# Patient Record
Sex: MALE | Race: WHITE | Employment: OTHER | URBAN - METROPOLITAN AREA
[De-identification: names, ages, dates, MRNs, and addresses within clinical notes are randomized per-mention and may not be internally consistent; named-entity substitution may affect disease eponyms.]

---

## 2016-12-15 LAB — TSH SERPL-ACNC: 0.61 ULU/ML (ref 0.35–4.94)

## 2018-10-05 LAB
ALT SERPL-CCNC: 23 IU/L (ref 8–45)
CHOLEST SERPL-MCNC: 169 MG/DL (ref 100–199)
HDLC SERPL-MCNC: 63 MG/DL
LDLC SERPL CALC-MCNC: 88 MG/DL
NONHDLC SERPL-MCNC: 106 MG/DL
TRIGL SERPL-MCNC: 91 MG/DL

## 2020-03-03 LAB — INR PPP: 1

## 2020-07-09 ENCOUNTER — TRANSFERRED RECORDS (OUTPATIENT)
Dept: HEALTH INFORMATION MANAGEMENT | Facility: CLINIC | Age: 60
End: 2020-07-09

## 2020-07-30 ENCOUNTER — TRANSFERRED RECORDS (OUTPATIENT)
Dept: HEALTH INFORMATION MANAGEMENT | Facility: CLINIC | Age: 60
End: 2020-07-30

## 2020-10-21 ENCOUNTER — TRANSFERRED RECORDS (OUTPATIENT)
Dept: HEALTH INFORMATION MANAGEMENT | Facility: CLINIC | Age: 60
End: 2020-10-21

## 2020-10-27 ENCOUNTER — TRANSFERRED RECORDS (OUTPATIENT)
Dept: HEALTH INFORMATION MANAGEMENT | Facility: CLINIC | Age: 60
End: 2020-10-27

## 2020-11-16 ENCOUNTER — TRANSFERRED RECORDS (OUTPATIENT)
Dept: HEALTH INFORMATION MANAGEMENT | Facility: CLINIC | Age: 60
End: 2020-11-16

## 2020-11-16 LAB
CREAT SERPL-MCNC: 0.96 MG/DL (ref 0.72–1.25)
GFR SERPL CREATININE-BSD FRML MDRD: >60 ML/MIN/1.73M2
GLUCOSE SERPL-MCNC: 112 MG/DL (ref 65–100)
POTASSIUM SERPL-SCNC: 4.5 MMOL/L (ref 3.5–5)

## 2020-11-24 ENCOUNTER — TRANSFERRED RECORDS (OUTPATIENT)
Dept: HEALTH INFORMATION MANAGEMENT | Facility: CLINIC | Age: 60
End: 2020-11-24

## 2020-12-02 ENCOUNTER — TRANSFERRED RECORDS (OUTPATIENT)
Dept: HEALTH INFORMATION MANAGEMENT | Facility: CLINIC | Age: 60
End: 2020-12-02

## 2020-12-03 ENCOUNTER — TRANSFERRED RECORDS (OUTPATIENT)
Dept: HEALTH INFORMATION MANAGEMENT | Facility: CLINIC | Age: 60
End: 2020-12-03

## 2020-12-07 ENCOUNTER — TRANSFERRED RECORDS (OUTPATIENT)
Dept: HEALTH INFORMATION MANAGEMENT | Facility: CLINIC | Age: 60
End: 2020-12-07

## 2020-12-07 LAB — EJECTION FRACTION: NORMAL %

## 2020-12-14 ENCOUNTER — TRANSFERRED RECORDS (OUTPATIENT)
Dept: HEALTH INFORMATION MANAGEMENT | Facility: CLINIC | Age: 60
End: 2020-12-14

## 2020-12-26 ENCOUNTER — TRANSFERRED RECORDS (OUTPATIENT)
Dept: HEALTH INFORMATION MANAGEMENT | Facility: CLINIC | Age: 60
End: 2020-12-26

## 2020-12-28 ENCOUNTER — TRANSFERRED RECORDS (OUTPATIENT)
Dept: HEALTH INFORMATION MANAGEMENT | Facility: CLINIC | Age: 60
End: 2020-12-28

## 2021-01-04 ENCOUNTER — TRANSFERRED RECORDS (OUTPATIENT)
Dept: HEALTH INFORMATION MANAGEMENT | Facility: CLINIC | Age: 61
End: 2021-01-04

## 2021-01-13 ENCOUNTER — TRANSFERRED RECORDS (OUTPATIENT)
Dept: HEALTH INFORMATION MANAGEMENT | Facility: CLINIC | Age: 61
End: 2021-01-13

## 2021-01-27 ENCOUNTER — TRANSFERRED RECORDS (OUTPATIENT)
Dept: HEALTH INFORMATION MANAGEMENT | Facility: CLINIC | Age: 61
End: 2021-01-27

## 2021-01-28 ENCOUNTER — TRANSFERRED RECORDS (OUTPATIENT)
Dept: HEALTH INFORMATION MANAGEMENT | Facility: CLINIC | Age: 61
End: 2021-01-28

## 2021-02-10 ENCOUNTER — TRANSFERRED RECORDS (OUTPATIENT)
Dept: HEALTH INFORMATION MANAGEMENT | Facility: CLINIC | Age: 61
End: 2021-02-10

## 2021-02-19 ENCOUNTER — TRANSFERRED RECORDS (OUTPATIENT)
Dept: HEALTH INFORMATION MANAGEMENT | Facility: CLINIC | Age: 61
End: 2021-02-19

## 2021-02-22 ENCOUNTER — MEDICAL CORRESPONDENCE (OUTPATIENT)
Dept: HEALTH INFORMATION MANAGEMENT | Facility: CLINIC | Age: 61
End: 2021-02-22

## 2021-02-22 ENCOUNTER — TELEPHONE (OUTPATIENT)
Dept: ORTHOPEDICS | Facility: CLINIC | Age: 61
End: 2021-02-22

## 2021-02-22 NOTE — TELEPHONE ENCOUNTER
See phone message.  I called pt back. He wants appt new for Cervical Spine & Lumbar Spine so I told him  does not treat cervical spine & advised  he see either  or  & he agreed.  appt made.  Monica Mckeon RN.  .

## 2021-02-23 ENCOUNTER — TELEPHONE (OUTPATIENT)
Dept: ORTHOPEDICS | Facility: CLINIC | Age: 61
End: 2021-02-23

## 2021-02-23 NOTE — TELEPHONE ENCOUNTER
RECORDS RECEIVED FROM: Neck pain & headaches & Low back pain-pt will get New Lumbar MRI done at [facility] before appt.  need imaging and records including  cervical MRIs from [facility] done in 2021 & 2020, [facility], [facility] imaging & [facility] including Spinal Cord Stim. placed 12-3-21 at [facility] and records from [facility] including Lumbar Ablation Per Monica GRANDE, has not had surgery on spine / [facility]   DATE RECEIVED: Apr 1, 2021     NOTES STATUS DETAILS   OFFICE NOTE from referring provider Care Everywhere    OFFICE NOTE from other specialist In process    DISCHARGE SUMMARY from hospital N/A    DISCHARGE REPORT from the ER N/A    OPERATIVE REPORT Care Everywhere 13 from 2017 to now   MEDICATION LIST Internal    IMPLANT RECORD/STICKER Care Everywhere    LABS     CBC/DIFF N/A    CULTURES N/A    INJECTIONS DONE IN RADIOLOGY N/A    MRI In process    CT SCAN In process    XRAYS (IMAGES & REPORTS) In process    TUMOR     PATHOLOGY  Slides & report N/A    03/25/21   1:50 PM   [facility] IMAGES RESOLVED IN PACS  CALLED [facility] AGAIN AND WAS SENT TO   Vangie Livingston CMA      03/23/21   2:52 PM   CALLED [facility] FOR IMAGES, THEY SAID ALL IMAGES ARE AT [facility] AND [facility].  CALLED [facility] AND THEY WILL PUSH WHAT THEY HAVE  CALLED [facility], WAS DIRECTED TO . [facility] FOR IMAGES.  Vangie Livingston CMA    03/19/21   1:31 PM   [facility] IMAGES RESOLVED IN PACS  Vangie Livingston CMA    03/16/21   11:47 AM   [facility] RECORDS SENT TO [facility]  Vangie Livingston CMA    02/23/21   2:54 PM   Called [facility] for images  Request sent to [facility]  All stimulator and branch block notes in care everywhere  Vangie Livingston CMA    Action 3/17/2021 2:40PM    Action Taken I called [facility] to have IMG pushed to PACS. [phone] - phone line was busy- I was on hold for 5mins.     I called [facility] Ph: [phone] #3- their phone went to

## 2021-02-23 NOTE — TELEPHONE ENCOUNTER
Health Call Center    Phone Message    May a detailed message be left on voicemail: yes     Reason for Call: Other: This pt of Dr. Vega's called to speak with someone on Dr. Veag's care team. This pt is scheduled for an appt with Dr. Vega on 4/1. This pt was told the clinic would be sending him release of information documents that he would foward to various locations. The pt is requesting that someone on Dr. Vega's care team have those THANG forms sent to him at BOTH email addresses on file. Please reach out to the pt for any additional information.     Action Taken: Message routed to:  [facility]o    Travel Screening: Not Applicable

## 2021-03-01 NOTE — TELEPHONE ENCOUNTER
[facility] Call [facility]    Phone Message    May a detailed message be left on voicemail: yes     Reason for Call: Other: Patient has not seen any of the THANG forms in his email. He would like it also sent to his wifes email at [email or web address]     Action Taken: Other: ORTHO    Travel Screening: Not Applicable

## 2021-03-01 NOTE — TELEPHONE ENCOUNTER
Emailed THANG form to email that patient provided. Called patient to confirm - his wife did receive the email. He was telling me more info about his appointment and our call disconnected, and I was unable to reach again him after multiple times of trying to call him back (call went straight to voicemail every time).

## 2021-03-04 ENCOUNTER — TELEPHONE (OUTPATIENT)
Dept: ORTHOPEDICS | Facility: CLINIC | Age: 61
End: 2021-03-04

## 2021-03-04 NOTE — TELEPHONE ENCOUNTER
[facility] Call Center    Phone Message    May a detailed message be left on voicemail: yes     Reason for Call: Other: Patient wondering if the forms he recently faxed were received. Please call paitent back to verify     Action Taken: Message routed to:  [facility] ortho    Travel Screening: Not Applicable

## 2021-03-15 NOTE — TELEPHONE ENCOUNTER
[facility] Call Center    Phone Message    May a detailed message be left on voicemail: yes     Reason for Call: Other: Patient's spouse is checking on the status of information being faxed over to Dr. Vega's office from their doctor's office.     Action Taken: Message routed to:  [facility] Orthopedics    Travel Screening: Not Applicable                                                                     3

## 2021-03-15 NOTE — TELEPHONE ENCOUNTER
Called patient's wife back and let her know that we still have not received anything. They will call the [facility] clinic as well as [facility] to let them know that the records need to be sent to us.

## 2021-03-16 DIAGNOSIS — M54.9 BACK PAIN: Primary | ICD-10-CM

## 2021-03-16 DIAGNOSIS — M54.2 NECK PAIN: ICD-10-CM

## 2021-03-19 ENCOUNTER — TELEPHONE (OUTPATIENT)
Dept: ORTHOPEDICS | Facility: CLINIC | Age: 61
End: 2021-03-19

## 2021-03-19 NOTE — TELEPHONE ENCOUNTER
[facility] Call Center    Phone Message    May a detailed message be left on voicemail: yes     Reason for Call: Other: Jasmine from [facility] is requesting a call back. She wants to verify if we have received the patients records, as well as what kind of specific records would we need from them for the patient. If you could give her a call back as soon as you can.      Action Taken: Other: ORTHO    Travel Screening: Not Applicable

## 2021-03-31 NOTE — PROGRESS NOTES
In-Person Visit    REASON FOR CONSULTATION: Consult (Entire spine pain. Having headaches and light headedness)     REFERRING PHYSICIAN: patient states he was referred from the [facility] in [place] Tania Friday  PCP: Christo Duarte    History of Present Illness:  60/m, RHD, with a complex PMH including post concussion syndrome, cervicogenic headaches, lumbosacral facet arthropathy, chronic pain, depression, anxiety, CVA and TIA seen for headaches and cervical, thoracic and lumbar spine pain.     Regarding his headaches and neck pain, the patient states he has been experiencing daily neck pain and headaches since 1995 when a TV landed on top of his head. Patient reports his neck pain and headaches are his most bothersome complaint currently, as they interfere with his cognitive function and ability to work. Patient endorses pain radiating to his left shoulder and arm to the elbow. He also endorses bilateral numbness and paresthesias in his right small, ring and middle fingers.  Patient states he has never had surgery on his cervical spine. He reports trouble with dexterity and fine motor movements in his hands, which has been present for the last 6 months.     Neck and headaches 80%, Arm 20%,  Left > Right  Worse:  Increased activity and head movements.   Better:  Ice and heat     Regarding his back pain, the patient reports approximately 20 plus years of lower back pain. He endorses one year of associated pain radiating into the right buttock and right posterior thigh to the knee, as well as, numbness to plantar aspect of the bilateral feet, R > L.  He endorses difficulty with his balance.     Back 80%, leg 20%, Right > Left   Worse: increased activity, movement, lifting at work.   Better: ice and heat     Previous treament:   Oral antiinflammatories - no significant relief.   Has taken up to 180mg of Oxycodone per day in the past for chronic pain.   Currently taking 30 mg of Hydrocodone per day for pain  regimen.  Physical therapy including aqua therapy, which he notes provided moderate relief. Patient states it has been years since he has done any formal therapy.   Acupuncture, massage, chiropractic with mild relief.     Multiple Cervical and lumbar RFA procedures ([facility] and other pain clinic) - modest relief.  Spinal stimulator in December of 2020 (Dr. José López, [facility]) - moderate relief.     Oswestry (JESSICA) Questionnaire    OSWESTRY DISABILITY INDEX 4/1/2021   Count 9   Sum 27   Oswestry Score (%) 60   Some recent data might be hidden      Neck Disability Index (NDI) Questionnaire    Neck Disability Index (NDI) 4/1/2021   Neck Disability Index: Count 10   NDI: Total Score = SUM (points for all 10 findings) 33   Neck Disability in Percent = (Total Score) / 50 * 100 66 (%)   Some recent data might be hidden      Visual Analog Pain Scale  Back Pain Scale 0-10: 7  Right leg pain: 0  Left leg pain: 0  Neck Pain Scale 0-10: 7  Right arm pain: 0  Left arm pain: 0    PROMIS-10 Scores  Global Mental Health Score: (P) 9  Global Physical Health Score: (P) 9  PROMIS TOTAL - SUBSCORES: (P) 18    ROS: A 12-point review of systems was completed and is negative except for otherwise noted above in the history of present illness.    Med Hx:  No past medical history on file.   Depression   Other acquired torsion dystonia   Neck pain   Chest wall pain   Migraine without aura, intractable   Cerebral infarction   Bicuspid aortic valve.   Facet arthropathy, lumbosacral   Spondyloarthritis   Chronic pain associated with significant psychosocial dysfunction   Arthropathy of cervical facet joint  AC joint arthritis   TIA   Malignant neoplasm of left breast   Uncomplicated opioid dependence   Concussion without LOC   Post concussion syndrome   Vertebral artery dissection   Cervical dystonia   Cervicalgia   Chronic pain syndrome   PHILLIP     Surg Hx:  No past surgical history on file.    Allergies:  No Known  "Allergies    Meds:  Current Outpatient Medications   Medication     atorvastatin (LIPITOR) 40 MG tablet     cyclobenzaprine (FLEXERIL) 10 MG tablet     diazepam (VALIUM) 5 MG tablet     ferrous sulfate (FEROSUL) 325 (65 Fe) MG tablet     HYDROcodone-acetaminophen (NORCO)  MG per tablet     magnesium sulfate 40 GM/1000ML SOLN injection     pantoprazole (PROTONIX) 40 MG EC tablet     prazosin (MINIPRESS) 2 MG capsule     senna-docusate (SENOKOT-S/PERICOLACE) 8.6-50 MG tablet     topiramate (TOPAMAX) 25 MG tablet     traZODone (DESYREL) 100 MG tablet     No current facility-administered medications for this visit.      Fam Hx:  No family history on file.    P/S Hx:  former smoker, 3 pack years   Rare alcohol use.   Lives in mobile home with wife.   Works as  at [employer].     Physical Exam:  Very pleasant, alert, oriented x 3, cooperative.  Not in cardiorespiratory distress.  Ht 1.676 m (5' 6\")   Wt 74.8 kg (165 lb)   BMI 26.63 kg/m    Normal upright posture.    Normal gait without assistive device.  No antalgia / imbalance.  Positive Romberg's.    Neck: normal neck posture  No deformity, no skin lesions or surgical scars.  Localizes pain throughout posterior midline and paraspinal cervical spine into the occipital region.   Diffuse paraspinal tenderness. No midline tenderness.   ROM:reduced flexion, extension, R and L rotation secondary to pain and stiffness   (+) Spurling's bilat.  (+) Lhermitte's.     Neuro Exam:  Motor: 3/5 strength for all muscle groups in both UE's.  Sensory:  Intact to light touch in both UE's.   Reflexes:      Biceps 2+ bilat.      Brachioradialis 2+ bilat.      Triceps 1+ bilat.    (-) Travis bilat.    Upper extremity:  Full pulses, pink nailbeds, good capillary / refill.  (-) atrophy / asymmetry.    Back: no deformity, well healed small lumbar midline surgical scar and well healed surgical scar to right lower back. No skin lesions.   Tenderness: (-) midline. + paraspinal " tenderness bilaterally, (-) R and L PSIS.  ROM:   Reduced flexion and extension limited by pain and stiffness.     Neuro Exam:  Motor: 3/5 strength for all muscle groups in both LE's, left ankle plantar flexion mildly weaker than right.   Sensory:  Intact to light touch in both LE's.   Reflexes:  Knee 2+ bilat.  Ankle 2+ bilaterally.  (-) Babinski, (-) clonus.    Lower Extremity:  Equal leg lengths, full pulses, (-) atrophy / asymmetry.  Full painless passive knee and ankle motion.  Straight leg raise: (-) right, (-) left.  Hip impingement: (-) right, (-) left.  MARIELLE/Duy's: (-) right, (-) left.        Imaging:   Cervical MRI 1/13/2021 shows multilevel cervical degenerative changes or spondylosis, but without clear focal stenosis.  No spinal cord deformation; no cord signal change or myelomalacia.  Radiologist report:  IMPRESSION:  1.  Multilevel degenerative disc disease.  2.  No central spinal stenosis.  3.  Disc/osteophyte complexes cause foraminal stenosis which at C3-C4 is moderate bilaterally, at C4-C5 and C5-C6 is severe bilaterally, at C6-C7 is moderate on the left and mild on the right.  4.  2 cm thyroid nodule in the right side. Thyroid ultrasound is recommended.    Impression:   60/m, RHD, with:  1.  Multilevel spondylosis C3-C7.  2.  Severe foraminal stenosis bilateral C4-5 and C5-6; milder at bilateral C3-4 and left C6-7.  3.  Chronic axial neck pain and cervicogenic headaches.  4.  Multiple medical co-morbidities including post concussion syndrome, chronic pain, depression, anxiety, CVA and TIA.    Plan:   Imaging was reviewed with the patient. Conservative and surgical options were discussed. The patient's cervical symptoms are likely related to his cervicogenic headaches, however, he does have findings of foraminal stenosis at multiple levels including C4-5 and C5-6, which could be contributing to his radicular symptoms in his left shoulder and arm. We ordered a left C5 nerve root injection with  steroid today to determine if this offers any relief. If the patient does not have any relief, we will then order a left C6 nerve root injection with steroid. If the patient has no relief with either of these injections, then it is unlikely that surgery will provide any benefit. We would then refer the patient to Dr. Giordano with PM&R or the pain clinic. We also ordered updated cervical flexion and extension x-rays today in clinic and an updated cervical CT.     - Left C5 SNRB with steroid (dxtic and txtic).  Advised to call 1-2 days after to let us know of injection response.  Virtual RTC after to discuss injection response.  If (-), may consider left C6 SNRB with steoid.  If both injections (-), will consider referral to either [facility] (Dr. Giordano) or [facility] - for further eval and mgmt.    All questions and concerns were answered to the patient's apparent satisfaction before leaving the clinic.     Maryjane Randall MD   Orthopedic Surgery, PGY-1    Attestation:  I (Dr. Buzz Vega - Spine Surgeon) have personally evaluated patient with PGY-1 Tonia, and agree with findings and plan outlined in the note, which I also edited.  I discussed at length with the patient/family, explained the nature of spinal condition, and formulated workup and/or treatment plan together.  All questions were answered to the best of my ability and to patient's apparent satisfaction.    TT 45 mins.    Buzz Vega MD    Orthopaedic Spine Surgery  Dept Orthopaedic Surgery, [facility]  [phone] office, [phone] pager  [email or web address]

## 2021-04-01 ENCOUNTER — OFFICE VISIT (OUTPATIENT)
Dept: ORTHOPEDICS | Facility: CLINIC | Age: 61
End: 2021-04-01
Payer: COMMERCIAL

## 2021-04-01 ENCOUNTER — ANCILLARY PROCEDURE (OUTPATIENT)
Dept: GENERAL RADIOLOGY | Facility: CLINIC | Age: 61
End: 2021-04-01
Attending: ORTHOPAEDIC SURGERY
Payer: COMMERCIAL

## 2021-04-01 ENCOUNTER — PRE VISIT (OUTPATIENT)
Dept: ORTHOPEDICS | Facility: CLINIC | Age: 61
End: 2021-04-01

## 2021-04-01 VITALS — HEIGHT: 66 IN | WEIGHT: 165 LBS | BODY MASS INDEX: 26.52 KG/M2

## 2021-04-01 DIAGNOSIS — M54.12 CERVICAL RADICULOPATHY: ICD-10-CM

## 2021-04-01 DIAGNOSIS — M54.2 NECK PAIN: Primary | ICD-10-CM

## 2021-04-01 DIAGNOSIS — M54.9 BACK PAIN: ICD-10-CM

## 2021-04-01 DIAGNOSIS — M54.2 NECK PAIN: ICD-10-CM

## 2021-04-01 PROCEDURE — 99204 OFFICE O/P NEW MOD 45 MIN: CPT | Mod: GC | Performed by: ORTHOPAEDIC SURGERY

## 2021-04-01 PROCEDURE — 72082 X-RAY EXAM ENTIRE SPI 2/3 VW: CPT | Performed by: STUDENT IN AN ORGANIZED HEALTH CARE EDUCATION/TRAINING PROGRAM

## 2021-04-01 PROCEDURE — 72040 X-RAY EXAM NECK SPINE 2-3 VW: CPT | Performed by: RADIOLOGY

## 2021-04-01 RX ORDER — PANTOPRAZOLE SODIUM 40 MG/1
40 TABLET, DELAYED RELEASE ORAL 2 TIMES DAILY
COMMUNITY
Start: 2021-01-20

## 2021-04-01 RX ORDER — HYDROCODONE BITARTRATE AND ACETAMINOPHEN 10; 325 MG/1; MG/1
1 TABLET ORAL EVERY 4 HOURS PRN
COMMUNITY
Start: 2021-03-02

## 2021-04-01 RX ORDER — CYCLOBENZAPRINE HCL 10 MG
10 TABLET ORAL 2 TIMES DAILY PRN
COMMUNITY
Start: 2020-09-29

## 2021-04-01 RX ORDER — MAGNESIUM SULFATE HEPTAHYDRATE 40 MG/ML
INJECTION, SOLUTION INTRAVENOUS 3 TIMES DAILY
COMMUNITY
Start: 2020-08-01

## 2021-04-01 RX ORDER — FERROUS SULFATE 325(65) MG
325 TABLET ORAL
COMMUNITY

## 2021-04-01 RX ORDER — TRAZODONE HYDROCHLORIDE 100 MG/1
100-200 TABLET ORAL AT BEDTIME
COMMUNITY
Start: 2021-03-05

## 2021-04-01 RX ORDER — DIAZEPAM 5 MG
TABLET ORAL 3 TIMES DAILY
COMMUNITY
Start: 2021-02-04

## 2021-04-01 RX ORDER — TOPIRAMATE 25 MG/1
50 TABLET, FILM COATED ORAL AT BEDTIME
COMMUNITY
Start: 2021-02-01

## 2021-04-01 RX ORDER — AMOXICILLIN 250 MG
1 CAPSULE ORAL EVERY EVENING
COMMUNITY
Start: 2020-07-28

## 2021-04-01 RX ORDER — ATORVASTATIN CALCIUM 40 MG/1
40 TABLET, FILM COATED ORAL EVERY EVENING
COMMUNITY
Start: 2021-01-20

## 2021-04-01 RX ORDER — PRAZOSIN HYDROCHLORIDE 2 MG/1
2 CAPSULE ORAL AT BEDTIME
COMMUNITY
Start: 2020-11-11

## 2021-04-01 ASSESSMENT — MIFFLIN-ST. JEOR: SCORE: 1496.19

## 2021-04-01 NOTE — LETTER
Date:April 10, 2021      Patient was self referred, no letter generated. Do not send.        [facility] Health Information

## 2021-04-01 NOTE — LETTER
4/1/2021         RE: Duy Resendiz  [address] [facility] [id number]        Dear Colleague,    Thank you for referring your patient, Duy Resendiz, to the [facility] [place]. Please see a copy of my visit note below.    In-Person Visit    REASON FOR CONSULTATION: Consult (Entire spine pain. Having headaches and light headedness)     REFERRING PHYSICIAN: patient states he was referred from the [facility] [place]  PCP: Christo Duarte    History of Present Illness:  60/m, RHD, with a complex PMH including post concussion syndrome, cervicogenic headaches, lumbosacral facet arthropathy, chronic pain, depression, anxiety, CVA and TIA seen for headaches and cervical, thoracic and lumbar spine pain.     Regarding his headaches and neck pain, the patient states he has been experiencing daily neck pain and headaches since 1995 when a TV landed on top of his head. Patient reports his neck pain and headaches are his most bothersome complaint currently, as they interfere with his cognitive function and ability to work. Patient endorses pain radiating to his left shoulder and arm to the elbow. He also endorses bilateral numbness and paresthesias in his right small, ring and middle fingers.  Patient states he has never had surgery on his cervical spine. He reports trouble with dexterity and fine motor movements in his hands, which has been present for the last 6 months.     Neck and headaches 80%, Arm 20%,  Left > Right  Worse:  Increased activity and head movements.   Better:  Ice and heat     Regarding his back pain, the patient reports approximately 20 plus years of lower back pain. He endorses one year of associated pain radiating into the right buttock and right posterior thigh to the knee, as well as, numbness to plantar aspect of the bilateral feet, R > L.  He endorses difficulty with his balance.     Back 80%, leg 20%, Right > Left   Worse: increased  activity, movement, lifting at work.   Better: ice and heat     Previous treament:   Oral antiinflammatories - no significant relief.   Has taken up to 180mg of Oxycodone per day in the past for chronic pain.   Currently taking 30 mg of Hydrocodone per day for pain regimen.  Physical therapy including aqua therapy, which he notes provided moderate relief. Patient states it has been years since he has done any formal therapy.   Acupuncture, massage, chiropractic with mild relief.     Multiple Cervical and lumbar RFA procedures ([facility] and other pain clinic) - modest relief.  Spinal stimulator in December of 2020 (Dr. José López, [facility]) - moderate relief.     Oswestry (JESSICA) Questionnaire    OSWESTRY DISABILITY INDEX 4/1/2021   Count 9   Sum 27   Oswestry Score (%) 60   Some recent data might be hidden      Neck Disability Index (NDI) Questionnaire    Neck Disability Index (NDI) 4/1/2021   Neck Disability Index: Count 10   NDI: Total Score = SUM (points for all 10 findings) 33   Neck Disability in Percent = (Total Score) / 50 * 100 66 (%)   Some recent data might be hidden      Visual Analog Pain Scale  Back Pain Scale 0-10: 7  Right leg pain: 0  Left leg pain: 0  Neck Pain Scale 0-10: 7  Right arm pain: 0  Left arm pain: 0    PROMIS-10 Scores  Global Mental Health Score: (P) 9  Global Physical Health Score: (P) 9  PROMIS TOTAL - SUBSCORES: (P) 18    ROS: A 12-point review of systems was completed and is negative except for otherwise noted above in the history of present illness.    Med Hx:  No past medical history on file.   Depression   Other acquired torsion dystonia   Neck pain   Chest wall pain   Migraine without aura, intractable   Cerebral infarction   Bicuspid aortic valve.   Facet arthropathy, lumbosacral   Spondyloarthritis   Chronic pain associated with significant psychosocial dysfunction   Arthropathy of cervical facet joint  AC joint arthritis   TIA   Malignant neoplasm of left  "breast   Uncomplicated opioid dependence   Concussion without LOC   Post concussion syndrome   Vertebral artery dissection   Cervical dystonia   Cervicalgia   Chronic pain syndrome   PHILLIP     Surg Hx:  No past surgical history on file.    Allergies:  No Known Allergies    Meds:  Current Outpatient Medications   Medication     atorvastatin (LIPITOR) 40 MG tablet     cyclobenzaprine (FLEXERIL) 10 MG tablet     diazepam (VALIUM) 5 MG tablet     ferrous sulfate (FEROSUL) 325 (65 Fe) MG tablet     HYDROcodone-acetaminophen (NORCO)  MG per tablet     magnesium sulfate 40 GM/1000ML SOLN injection     pantoprazole (PROTONIX) 40 MG EC tablet     prazosin (MINIPRESS) 2 MG capsule     senna-docusate (SENOKOT-S/PERICOLACE) 8.6-50 MG tablet     topiramate (TOPAMAX) 25 MG tablet     traZODone (DESYREL) 100 MG tablet     No current facility-administered medications for this visit.      Fam Hx:  No family history on file.    P/S Hx:  former smoker, 3 pack years   Rare alcohol use.   Lives in mobile home with wife.   Works as  at [employer].     Physical Exam:  Very pleasant, alert, oriented x 3, cooperative.  Not in cardiorespiratory distress.  Ht 1.676 m (5' 6\")   Wt 74.8 kg (165 lb)   BMI 26.63 kg/m    Normal upright posture.    Normal gait without assistive device.  No antalgia / imbalance.  Positive Romberg's.    Neck: normal neck posture  No deformity, no skin lesions or surgical scars.  Localizes pain throughout posterior midline and paraspinal cervical spine into the occipital region.   Diffuse paraspinal tenderness. No midline tenderness.   ROM:reduced flexion, extension, R and L rotation secondary to pain and stiffness   (+) Spurling's bilat.  (+) Lhermitte's.     Neuro Exam:  Motor: 3/5 strength for all muscle groups in both UE's.  Sensory:  Intact to light touch in both UE's.   Reflexes:      Biceps 2+ bilat.      Brachioradialis 2+ bilat.      Triceps 1+ bilat.    (-) Travis bilat.    Upper " extremity:  Full pulses, pink nailbeds, good capillary / refill.  (-) atrophy / asymmetry.    Back: no deformity, well healed small lumbar midline surgical scar and well healed surgical scar to right lower back. No skin lesions.   Tenderness: (-) midline. + paraspinal tenderness bilaterally, (-) R and L PSIS.  ROM:   Reduced flexion and extension limited by pain and stiffness.     Neuro Exam:  Motor: 3/5 strength for all muscle groups in both LE's, left ankle plantar flexion mildly weaker than right.   Sensory:  Intact to light touch in both LE's.   Reflexes:  Knee 2+ bilat.  Ankle 2+ bilaterally.  (-) Babinski, (-) clonus.    Lower Extremity:  Equal leg lengths, full pulses, (-) atrophy / asymmetry.  Full painless passive knee and ankle motion.  Straight leg raise: (-) right, (-) left.  Hip impingement: (-) right, (-) left.  MARIELLE/Duy's: (-) right, (-) left.        Imaging:   Cervical MRI 1/13/2021 shows multilevel cervical degenerative changes or spondylosis, but without clear focal stenosis.  No spinal cord deformation; no cord signal change or myelomalacia.  Radiologist report:  IMPRESSION:  1.  Multilevel degenerative disc disease.  2.  No central spinal stenosis.  3.  Disc/osteophyte complexes cause foraminal stenosis which at C3-C4 is moderate bilaterally, at C4-C5 and C5-C6 is severe bilaterally, at C6-C7 is moderate on the left and mild on the right.  4.  2 cm thyroid nodule in the right side. Thyroid ultrasound is recommended.    Impression:   60/m, RHD, with:  1.  Multilevel spondylosis C3-C7.  2.  Severe foraminal stenosis bilateral C4-5 and C5-6; milder at bilateral C3-4 and left C6-7.  3.  Chronic axial neck pain and cervicogenic headaches.  4.  Multiple medical co-morbidities including post concussion syndrome, chronic pain, depression, anxiety, CVA and TIA.    Plan:   Imaging was reviewed with the patient. Conservative and surgical options were discussed. The patient's cervical symptoms are  likely related to his cervicogenic headaches, however, he does have findings of foraminal stenosis at multiple levels including C4-5 and C5-6, which could be contributing to his radicular symptoms in his left shoulder and arm. We ordered a left C5 nerve root injection with steroid today to determine if this offers any relief. If the patient does not have any relief, we will then order a left C6 nerve root injection with steroid. If the patient has no relief with either of these injections, then it is unlikely that surgery will provide any benefit. We would then refer the patient to Dr. Giordano with PM&R or the pain clinic. We also ordered updated cervical flexion and extension x-rays today in clinic and an updated cervical CT.     - Left C5 SNRB with steroid (dxtic and txtic).  Advised to call 1-2 days after to let us know of injection response.  Virtual RTC after to discuss injection response.  If (-), may consider left C6 SNRB with steoid.  If both injections (-), will consider referral to either [facility] (Dr. Giordano) or [facility] - for further eval and mgmt.    All questions and concerns were answered to the patient's apparent satisfaction before leaving the clinic.     Maryjane Randall MD   Orthopedic Surgery, PGY-1    Attestation:  I (Dr. Buzz Vega - Spine Surgeon) have personally evaluated patient with PGY-1 Freet[place], and agree with findings and plan outlined in the note, which I also edited.  I discussed at length with the patient/family, explained the nature of spinal condition, and formulated workup and/or treatment plan together.  All questions were answered to the best of my ability and to patient's apparent satisfaction.    TT 45 mins.    Buzz Vega MD    Orthopaedic Spine Surgery  Dept Orthopaedic Surgery, [facility]  [phone] office, [phone] pager  [email or web address]        Again, thank you for allowing me to participate in the care  of your patient.        Sincerely,        Buzz Vega MD

## 2021-04-08 ENCOUNTER — TELEPHONE (OUTPATIENT)
Dept: ORTHOPEDICS | Facility: CLINIC | Age: 61
End: 2021-04-08

## 2021-04-08 NOTE — TELEPHONE ENCOUNTER
[facility] Call Center    Phone Message:  Pt has questions regarding the nerve block that he is getting on 4/30/21.  Pt is wondering how long this nerve block is supposed to be effective and also how this nerve block compares to other nerve blocks.  Pt would like a call back to go through his questions.    May a detailed message be left on voicemail: Yes     Reason for Call: Other: Nerve Block Questions, Call Back     Action Taken: Message routed to:  [facility] Team    Travel Screening: Not Applicable

## 2021-04-09 NOTE — TELEPHONE ENCOUNTER
Problem: Patient Care Overview  Goal: Plan of Care Review  Outcome: Ongoing (interventions implemented as appropriate)  Pt. On room air in no apparent distress. Will cont. To monitor.         See phone message from pt.  See dictation of 4-1-21 for plan.  I called pt back & told him there is no way to know if injection will help pain or how long that might last & he understood. He wanted to know if this is different than the previous RFAs he has had done & I told him yes this is not an Ablation.  Reviewed plan in dictation so pt knows to call us back 1 week after injection if NO relief so we can order the other injection to be done before RTC appt 5-18-21.  Pt agreed.    Call back prn. Pt agreed.  W.O./Monica Mkceon RN.

## 2021-04-25 ENCOUNTER — DOCUMENTATION ONLY (OUTPATIENT)
Dept: LAB | Facility: CLINIC | Age: 61
End: 2021-04-25

## 2021-04-26 ENCOUNTER — TELEPHONE (OUTPATIENT)
Dept: ORTHOPEDICS | Facility: CLINIC | Age: 61
End: 2021-04-26

## 2021-04-26 DIAGNOSIS — M54.12 CERVICAL RADICULOPATHY: ICD-10-CM

## 2021-04-26 DIAGNOSIS — M54.2 NECK PAIN: Primary | ICD-10-CM

## 2021-04-26 NOTE — TELEPHONE ENCOUNTER
[facility] Call Center    Phone Message    May a detailed message be left on voicemail: yes     Reason for Call: Other: Pt would like to start PT, dry-needing, pool therapy before starting with the other plan. Would like a call back to discuss with Monica.     Action Taken: Message routed to:  [facility] ortho    Travel Screening: Not Applicable

## 2021-04-26 NOTE — PROGRESS NOTES
See in basket message from Lab needing preprocedure COVID test order for Lab appt scheduled for tomorrow 4-27-21.  Those orders are placed by Radiology COVID Team but pt canceled his injection that had been scheduled for 4-30-21 so he does not need that Lab appt.  I canceled Lab appt.  See previous Triage note.    See 4-1-21 dictation for plan.  Pt also canceled his RTN appt 5-18-21.    I left pt message to call us back with his plan & we can certainly see him for another appt if he has questions about plan or wants to rediscuss options with .  Monica Mckeon RN.

## 2021-04-27 NOTE — TELEPHONE ENCOUNTER
See phone message.  See previous Triage message when pt canceled injection & RTN appt.    Pt called back & wants to try PT first including Dry needling & Pool therapy.  I left pt message that I will postal mail him 2 separate PT orders for land therapy including Dry needling & for Pool therapy & he should call Insurance to see where he can schedule.   I expressed best wishes that it will help his symptoms & to call us back prn to [facility] injection & RTN  appt prn.    S.o./Monica Mckeon RN.

## 2021-05-29 ENCOUNTER — RECORDS - HEALTHEAST (OUTPATIENT)
Dept: ADMINISTRATIVE | Facility: CLINIC | Age: 61
End: 2021-05-29

## 2021-05-30 ENCOUNTER — RECORDS - HEALTHEAST (OUTPATIENT)
Dept: ADMINISTRATIVE | Facility: CLINIC | Age: 61
End: 2021-05-30

## 2021-06-07 ENCOUNTER — TELEPHONE (OUTPATIENT)
Dept: ORTHOPEDICS | Facility: CLINIC | Age: 61
End: 2021-06-07

## 2021-06-07 NOTE — TELEPHONE ENCOUNTER
See phone message & see dictation of 4-1-21 for plan.    I called pt back who stated PT helped muscle pain & aches but still has Bilat. Hand numbness.    I reviewed plan in dictation & advised he RSC CT Cervical & XR Flex Ext Cervical & Left C5 NRB steroid  injection [phone] & he agreed.    He understands if No relief from injection then call us back to order different injection Left C6 SNRB with steroid & then make RTN appt [phone].  If gets relief then make RTN appt. Pt agreed.  W.O./Monica Mckeon RN.

## 2021-06-07 NOTE — TELEPHONE ENCOUNTER
[facility] Call Center    Phone Message    May a detailed message be left on voicemail: yes     Reason for Call: Order(s): Other:   Reason for requested: NEW order for cervical x-ray  Date needed: within 1-2 days  Provider name: Dr. Vega    Patient already has CT scan & injection scheduled for 7/21, so was hoping for an x-ray order early enough to schedule for the same day.    Action Taken: Message routed to:  [facility] ortho    Travel Screening: Not Applicable

## 2021-06-07 NOTE — TELEPHONE ENCOUNTER
"[facility] Call Center    Phone Message    May a detailed message be left on voicemail: yes     Reason for Call: Other: Patient stated PT is helping with muscle pain/ aches, though the symptoms of \"fogginess\" and bilateral hand numbness has not been resolved. Would like to know what Dr Vega suggests for next steps. Please call patient back at [phone]. Ok to leave  .      Action Taken: Message routed to:  [facility] Ortho    Travel Screening: Not Applicable                                                                      "

## 2021-06-09 DIAGNOSIS — Z11.59 ENCOUNTER FOR SCREENING FOR OTHER VIRAL DISEASES: ICD-10-CM

## 2021-06-18 DIAGNOSIS — M54.2 NECK PAIN: Primary | ICD-10-CM

## 2021-06-30 ENCOUNTER — TELEPHONE (OUTPATIENT)
Dept: ORTHOPEDICS | Facility: CLINIC | Age: 61
End: 2021-06-30

## 2021-06-30 NOTE — TELEPHONE ENCOUNTER
[facility] Call Center    Phone Message    May a detailed message be left on voicemail: yes     Reason for Call: Other: Patient would like to know more about the injection that was discussed. He would like to take his Covid test closer to home. Please reach out to pt     Action Taken: Message routed to:  [facility] ortho    Travel Screening: Not Applicable

## 2021-07-01 NOTE — TELEPHONE ENCOUNTER
See phone message & dictation of 4-1-21 for plan.  Pt canceled & RSC Cervical injection.  I called pt back & answered all his questions & reviewed plan in dictation so pt knows to call us back after injection about response to determine next steps.    Pt will also call COVID team [phone] to schedule test.  Call back prn patient agreed.  W.EMILIANO./Monica Mckeon RN.

## 2021-07-06 ENCOUNTER — MEDICAL CORRESPONDENCE (OUTPATIENT)
Dept: HEALTH INFORMATION MANAGEMENT | Facility: CLINIC | Age: 61
End: 2021-07-06

## 2021-07-07 ENCOUNTER — TELEPHONE (OUTPATIENT)
Dept: GASTROENTEROLOGY | Facility: CLINIC | Age: 61
End: 2021-07-07

## 2021-07-07 ENCOUNTER — TRANSCRIBE ORDERS (OUTPATIENT)
Dept: OTHER | Age: 61
End: 2021-07-07

## 2021-07-07 DIAGNOSIS — R93.3 ABNORMAL FINDING ON GI TRACT IMAGING: Primary | ICD-10-CM

## 2021-07-07 NOTE — TELEPHONE ENCOUNTER
[facility]     Referring provider: Dr. Wu Veliz at [facility]   Phone: [phone]    Referred to: [facility] Provider Group     Provider Requested: none specified     Referral Received: 7/7/21     Records received:   CTabdomen/pelvis 6/22/21  IMPRESSION:   1.  Again seen is a focal mass anterior wall of the distal gastric   body indeterminate. Findings may represent gastrointestinal stromal   tumor however remains indeterminate. Follow-up EGD recommended.  2.  Colonic diverticulosis with no diverticulitis.  3.  Fatty infiltration of the liver.  4.  Mild gallbladder varices.  5.  Left pelvic kidney with malrotation and scattered parenchymal   scarring of the left kidney. Focal parenchymal scarring right kidney.  6.  Prostatic enlargement with mass effect on the bladder base.  7.  PO changes GE junction.    CTAbdomen/pelvis 5/24/21  IMPRESSION:   1.  Diverticular disease involving the colon. There is mild edema   along the sigmoid colon which may reflect early or mild changes of   diverticulitis. No cuff locations.  2.  There is a solid enhancing nodule involving the wall of the   distal gastric body anteriorly. This may reflect a GI stromal tumor.   Recommend further assessment with endoscopy and possible biopsy.  3.  Fatty infiltration of liver.  4.  Mild gallbladder wall varices.  5.  Malrotation and distention of the left kidney which lies in the   upper pelvis in the midline. No associated hydronephrosis.    Colonoscopy [facility] on 2/16/21  Indications: Previous adenomatous polyp(s)    Family history of colon cancer in patient's brother. Age diagnosed:  60 and older.  Referring MD: Referral Self     Images received: Pushed to PACs on 7/7    Evaluation for: EUS to evaluate for possible GIST of stomach Abnormal finding on GI tract imaging     Clinical History (per RN review):   Virtual visit on 5/26/21  Still on antibiotic for diverticulitis. Yesterday was 1st time in last month that he had  formed stool, then back to soft/loose. Still bloated and gurgling stomach. No fevers. No vomiting. Appetite is fine. No blood in stool. Does not feel symptoms are improving or worsening, only on antibiotic for 2 days. Has an appointment with Dr. Mendoza at [facility] in June and Dr. Veliz from [facility] in July. Patient reiterates he is dissatisfied with his Linx device.    MD review date:   MD Decision for clinic consultation/Orders:            Referral updates/Patient contacted:

## 2021-07-08 ENCOUNTER — PATIENT OUTREACH (OUTPATIENT)
Dept: GASTROENTEROLOGY | Facility: CLINIC | Age: 61
End: 2021-07-08

## 2021-07-08 ENCOUNTER — PREP FOR PROCEDURE (OUTPATIENT)
Dept: GASTROENTEROLOGY | Facility: CLINIC | Age: 61
End: 2021-07-08

## 2021-07-08 DIAGNOSIS — Z11.52 ENCOUNTER FOR SCREENING FOR COVID-19: Primary | ICD-10-CM

## 2021-07-08 DIAGNOSIS — K31.9 SUBEPITHELIAL GASTRIC LESION: Primary | ICD-10-CM

## 2021-07-08 NOTE — PROGRESS NOTES
Called to discuss with patient.   Procedure/Imaging/Clinic: EGD/EUS   Physician: Gilmer   Timing: next available   Procedure length: 60 min   Anesthesia: MAC   Dx: gastric subepithelial lesion   Tier: 3   Location: [facility] or [facility] OR     Explained they can expect a call from  for date and time of procedure, will need a , someone to stay with them for 24 hours and should stay in town for 24 hours (within 45 min of Hospital) post procedure    Patient needs to get pre-op physical completed. If outside [facility] system will need physical faxed to number [phone]   If you do not get a preop physical, your procedure could be cancelled, patient voiced understanding*    Preop Plan: Will schedule with Dr Whyte, knows needs to be within 30 days of procedure    Med Review    Blood thinner -  none  ASA - none  Diabetic - none    COVID test discussed: yes, will have done [place] [facility], pt understands needs to be within 4 days of procedure    Patient Education r/t procedure: mychart  Ok to send education via [email or web address]    Does patient have any history of gastric bypass/gastric surgery/altered panc/bili anatomy? Lynx stomach/esophagus valve in place, treating acid reflux d/t hietal hernia, done at [facility] in October 2020    A pre-op nurse will call 1-2 days prior to the procedure. Is advised to be NPO/no solid food 8 hours before the procedure. Ok to drink clear liquids (Water, Apple Juice or Gatorade) up to 2 hours prior to procedure.     Verbalized understanding of all instructions. All questions answered.      Case request ordered, message sent to OR     Cindy Arriaza RN, BSN,   Advanced [facility]  Care coordinator   [phone]  Fax [phone]

## 2021-07-09 DIAGNOSIS — Z11.59 ENCOUNTER FOR SCREENING FOR OTHER VIRAL DISEASES: ICD-10-CM

## 2021-07-09 PROBLEM — K31.9 SUBEPITHELIAL GASTRIC LESION: Status: ACTIVE | Noted: 2021-07-09

## 2021-07-17 ENCOUNTER — HEALTH MAINTENANCE LETTER (OUTPATIENT)
Age: 61
End: 2021-07-17

## 2021-07-19 ENCOUNTER — LAB (OUTPATIENT)
Dept: LAB | Facility: CLINIC | Age: 61
End: 2021-07-19
Payer: COMMERCIAL

## 2021-07-19 ENCOUNTER — PATIENT OUTREACH (OUTPATIENT)
Dept: GASTROENTEROLOGY | Facility: CLINIC | Age: 61
End: 2021-07-19

## 2021-07-19 DIAGNOSIS — Z11.59 ENCOUNTER FOR SCREENING FOR OTHER VIRAL DISEASES: ICD-10-CM

## 2021-07-19 PROCEDURE — U0005 INFEC AGEN DETEC AMPLI PROBE: HCPCS

## 2021-07-19 PROCEDURE — U0003 INFECTIOUS AGENT DETECTION BY NUCLEIC ACID (DNA OR RNA); SEVERE ACUTE RESPIRATORY SYNDROME CORONAVIRUS 2 (SARS-COV-2) (CORONAVIRUS DISEASE [COVID-19]), AMPLIFIED PROBE TECHNIQUE, MAKING USE OF HIGH THROUGHPUT TECHNOLOGIES AS DESCRIBED BY CMS-2020-01-R: HCPCS

## 2021-07-19 NOTE — PROGRESS NOTES
Called patient to discuss missing pre op exam in preparation for procedure on 7/26,  Transferred call to PAC to have patient inquire about availability this week. Otherwise patient stated he would try to make an appt with his PCP Omar Arriaza RN, BSN,   [facility]  Care coordinator   [phone]  Fax [phone]

## 2021-07-20 LAB — SARS-COV-2 RNA RESP QL NAA+PROBE: NEGATIVE

## 2021-07-20 NOTE — TELEPHONE ENCOUNTER
FUTURE VISIT INFORMATION      SURGERY INFORMATION:    Date: 7/26/21    Location: [facility] OR    Surgeon:  Keyur Scherer MD    Anesthesia Type:  MAC    Procedure: ESOPHAGOGASTRODUODENOSCOPY (EGD) ENDOSCOPIC ULTRASOUND, ESOPHAGOSCOPY / UPPER GASTROINTESTINAL TRACT (GI)    RECORDS REQUESTED FROM:       Primary Care Provider: Billy Duarte MD  - [facility]    Most recent EKG+ Tracing: 3/3/20- [facility]    Most recent ECHO: 12/7/20

## 2021-07-21 ENCOUNTER — ANESTHESIA EVENT (OUTPATIENT)
Dept: SURGERY | Facility: CLINIC | Age: 61
End: 2021-07-21
Payer: COMMERCIAL

## 2021-07-21 ENCOUNTER — VIRTUAL VISIT (OUTPATIENT)
Dept: SURGERY | Facility: CLINIC | Age: 61
End: 2021-07-21
Payer: COMMERCIAL

## 2021-07-21 ENCOUNTER — ANCILLARY PROCEDURE (OUTPATIENT)
Dept: CT IMAGING | Facility: CLINIC | Age: 61
End: 2021-07-21
Payer: COMMERCIAL

## 2021-07-21 ENCOUNTER — PRE VISIT (OUTPATIENT)
Dept: SURGERY | Facility: CLINIC | Age: 61
End: 2021-07-21

## 2021-07-21 ENCOUNTER — ANCILLARY PROCEDURE (OUTPATIENT)
Dept: GENERAL RADIOLOGY | Facility: CLINIC | Age: 61
End: 2021-07-21
Payer: COMMERCIAL

## 2021-07-21 ENCOUNTER — ANCILLARY PROCEDURE (OUTPATIENT)
Dept: GENERAL RADIOLOGY | Facility: CLINIC | Age: 61
End: 2021-07-21
Attending: ORTHOPAEDIC SURGERY
Payer: COMMERCIAL

## 2021-07-21 VITALS
DIASTOLIC BLOOD PRESSURE: 92 MMHG | OXYGEN SATURATION: 97 % | TEMPERATURE: 97.9 F | SYSTOLIC BLOOD PRESSURE: 153 MMHG | HEART RATE: 66 BPM | RESPIRATION RATE: 17 BRPM

## 2021-07-21 DIAGNOSIS — M54.12 CERVICAL RADICULOPATHY: ICD-10-CM

## 2021-07-21 DIAGNOSIS — Z01.818 PREOP EXAMINATION: Primary | ICD-10-CM

## 2021-07-21 DIAGNOSIS — M54.2 NECK PAIN: ICD-10-CM

## 2021-07-21 PROCEDURE — 99203 OFFICE O/P NEW LOW 30 MIN: CPT | Mod: 95 | Performed by: PHYSICIAN ASSISTANT

## 2021-07-21 PROCEDURE — 72040 X-RAY EXAM NECK SPINE 2-3 VW: CPT | Mod: GC | Performed by: RADIOLOGY

## 2021-07-21 PROCEDURE — 64479 NJX AA&/STRD TFRM EPI C/T 1: CPT | Mod: LT | Performed by: RADIOLOGY

## 2021-07-21 PROCEDURE — 72125 CT NECK SPINE W/O DYE: CPT | Mod: GC | Performed by: RADIOLOGY

## 2021-07-21 RX ORDER — ASPIRIN 81 MG/1
81 TABLET ORAL EVERY MORNING
COMMUNITY

## 2021-07-21 RX ORDER — LIDOCAINE HYDROCHLORIDE 10 MG/ML
30 INJECTION, SOLUTION EPIDURAL; INFILTRATION; INTRACAUDAL; PERINEURAL ONCE
Status: COMPLETED | OUTPATIENT
Start: 2021-07-21 | End: 2021-07-21

## 2021-07-21 RX ORDER — DEXAMETHASONE SODIUM PHOSPHATE 10 MG/ML
10 INJECTION, SOLUTION INTRAMUSCULAR; INTRAVENOUS ONCE
Status: COMPLETED | OUTPATIENT
Start: 2021-07-21 | End: 2021-07-21

## 2021-07-21 RX ORDER — IOPAMIDOL 408 MG/ML
10 INJECTION, SOLUTION INTRATHECAL ONCE
Status: COMPLETED | OUTPATIENT
Start: 2021-07-21 | End: 2021-07-21

## 2021-07-21 RX ORDER — LACTOBACILLUS RHAMNOSUS GG 10B CELL
1 CAPSULE ORAL EVERY MORNING
COMMUNITY

## 2021-07-21 RX ADMIN — DEXAMETHASONE SODIUM PHOSPHATE 10 MG: 10 INJECTION, SOLUTION INTRAMUSCULAR; INTRAVENOUS at 08:49

## 2021-07-21 RX ADMIN — IOPAMIDOL 4 ML: 408 INJECTION, SOLUTION INTRATHECAL at 08:49

## 2021-07-21 RX ADMIN — LIDOCAINE HYDROCHLORIDE 5 ML: 10 INJECTION, SOLUTION EPIDURAL; INFILTRATION; INTRACAUDAL; PERINEURAL at 08:49

## 2021-07-21 ASSESSMENT — PAIN SCALES - GENERAL: PAINLEVEL: EXTREME PAIN (8)

## 2021-07-21 ASSESSMENT — LIFESTYLE VARIABLES: TOBACCO_USE: 1

## 2021-07-21 ASSESSMENT — ENCOUNTER SYMPTOMS: SEIZURES: 0

## 2021-07-21 NOTE — H&P
Pre-Operative H & P     CC:  Preoperative exam to assess for increased cardiopulmonary risk while undergoing surgery and anesthesia.    Date of Encounter: 7/21/2021  Primary Care Physician:  System, Provider Not In  Reason for Visit: Subepithelial gastric lesion       VIDEO-VISIT DETAILS    Type of service:  Video Visit    Patient verbally consented to video service today:  YES    Video Start Time: 1508  Video End Time (time video stopped): 1524    Originating Location (pt. Location): Home    Distant Location (provider location):  [facility]    Mode of Communication:  Video Conference via [employer]    HPI     Duy Resendiz is a 61 y/o male who presents for pre-operative H&P in preparation for ESOPHAGOGASTRODUODENOSCOPY (EGD); ENDOSCOPIC ULTRASOUND, ESOPHAGOSCOPY / UPPER GASTROINTESTINAL TRACT (GI) with Keyur Scherer MD on 7/26/21 at [facility] for treatment of Subepithelial gastric lesion. Patient is being evaluated for comorbid conditions of bicuspid aortic valve, moderate aortic stenosis, hx CVA, anxiety, depression, post concussion syndrome, cervicogenic headaches, cervical dystonia, hx vertebral artery dissection, GERD, dysphagia, s/p hiatal hernia repair, BPH, hx breast cancer, Malrotation and distention of the left kidney on imaging, fatty liver.    Mr. Resendiz has a history of GERD, s/p LINX procedure for hiatal hernia. A CT scan on 5/24/21 demonstrated a solid enhancing nodule involving the wall of the distal gastric body anteriorly, possibly reflecting a GI stromal tumor. He now presents for the above procedure to further clarify his diagnosis.    History was obtained from patient & chart review.   Past Medical History  Past Medical History:   Diagnosis Date     Anxiety      Bicuspid aortic valve      BPH (benign prostatic hyperplasia)      Cervical dystonia      Cervicogenic headache      Chronic pain syndrome      Depression       Dysphagia      Gastroesophageal reflux disease without esophagitis      History of CVA (cerebrovascular accident) 2013    Due to Tamoxifen     Nonrheumatic aortic valve stenosis      Post concussion syndrome      Subepithelial gastric lesion        Past Surgical History  Past Surgical History:   Procedure Laterality Date     HIATAL HERNIA REPAIR      LINX procedure     LYMPH NODE DISSECTION  2013    breast cancer     SPINAL CORD STIMULATOR IMPLANT  12/2020       Hx of Blood transfusions/reactions: denies     Hx of abnormal bleeding or anti-platelet use: on ASA 81 mg    Menstrual history: No LMP for male patient.:      Steroid use in the last year: denies    Personal or FH with difficulty with Anesthesia:  denies    Prior to Admission Medications  Current Outpatient Medications   Medication Sig Dispense Refill     aspirin 81 MG EC tablet Take 81 mg by mouth every morning       atorvastatin (LIPITOR) 40 MG tablet Take 40 mg by mouth every evening        cyclobenzaprine (FLEXERIL) 10 MG tablet Take 10 mg by mouth 2 times daily as needed        diazepam (VALIUM) 5 MG tablet 3 times daily        ferrous sulfate (FEROSUL) 325 (65 Fe) MG tablet Take 325 mg by mouth daily (with breakfast)       HYDROcodone-acetaminophen (NORCO)  MG per tablet Take 1 tablet by mouth every 4 hours as needed        lactobacillus rhamnosus, GG, (CULTURELL) capsule Take 1 capsule by mouth every morning       magnesium sulfate 40 GM/1000ML SOLN injection 3 times daily        pantoprazole (PROTONIX) 40 MG EC tablet Take 40 mg by mouth 2 times daily        prazosin (MINIPRESS) 2 MG capsule Take 2 mg by mouth At Bedtime        senna-docusate (SENOKOT-S/PERICOLACE) 8.6-50 MG tablet Take 1 tablet by mouth every evening        topiramate (TOPAMAX) 25 MG tablet Take 50 mg by mouth At Bedtime        traZODone (DESYREL) 100 MG tablet Take 100-200 mg by mouth At Bedtime          Allergies  No Known Allergies    Social History  Social History      Socioeconomic History     Marital status:      Spouse name: Not on file     Number of children: Not on file     Years of education: Not on file     Highest education level: Not on file   Occupational History     Not on file   Tobacco Use     Smoking status: Former Smoker     Types: Cigarettes     Smokeless tobacco: Never Used   Substance and Sexual Activity     Alcohol use: Not on file     Drug use: Not on file     Sexual activity: Not on file   Other Topics Concern     Not on file   Social History Narrative     Not on file     Social Determinants of Health     Financial Resource Strain:      Difficulty of Paying Living Expenses:    Food Insecurity:      Worried About Running Out of Food in the Last Year:      Ran Out of Food in the Last Year:    Transportation Needs:      Lack of Transportation (Medical):      Lack of Transportation (Non-Medical):    Physical Activity:      Days of Exercise per Week:      Minutes of Exercise per Session:    Stress:      Feeling of Stress :    Social Connections:      Frequency of Communication with Friends and Family:      Frequency of Social Gatherings with Friends and Family:      Attends [employer] Services:      Active Member of Clubs or Organizations:      Attends Club or Organization Meetings:      Marital Status:    Intimate Partner Violence:      Fear of Current or Ex-Partner:      Emotionally Abused:      Physically Abused:      Sexually Abused:        Family History  Family History   Problem Relation Age of Onset     Anesthesia Reaction No family hx of      Cardiovascular No family hx of      Deep Vein Thrombosis (DVT) No family hx of           ROS/MED HX  The complete review of systems is negative other than noted in the HPI or here.  Patient denies recent illness, fever and respiratory infection during past month.  Pt denies steroid use during past year.    ENT/Pulmonary:     (+) tobacco use, Past use,  (-) asthma and sleep apnea   Neurologic: Comment: Chronic  headaches since 1995 due to concussion.    Hx vertebral artery dissection      (+) CVA, date: 2013, without deficits,  (-) no seizures   Cardiovascular:     (+) -----valvular problems/murmurs type: AS bicuspid aortic valve, gradient 26 mmHg. Previous cardiac testing   Echo: Date: 12/7/20 Results:   Visually Estimated EF: 65-70%    Normal LV size and systolic function.    Moderate aortic stenosis, mean aortic transvalvular gradient is 26 mmHg.    Normal estimated PA pressures on this study.    The ascending throacic aorta is midly dilated, measuring 3.9 cm superior to the sinotubular   ridge.     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 No associated hydronephrosis.      (+) BPH,  (-) renal disease   Endo:  - neg endo ROS  (-) Type II DM   Psychiatric/Substance Use:     (+) psychiatric history anxiety and depression     Infectious Disease:  - neg infectious disease ROS     Malignancy:   (+) Malignancy, History of Breast.Breast CA Remission status post Surgery.        Other:  - neg other ROS                                    0 lbs 0 oz  Data Unavailable   There is no height or weight on file to calculate BMI.       Physical Exam  Constitutional: Awake, alert, cooperative, no apparent distress, and appears stated age.  Neurologic: Awake, alert, oriented to name, place and time.   Neuropsychiatric: Calm, cooperative. Normal affect. Answers questions  appropriately.    ** Patient's visit was done virtually today.  A full physical exam was not completed.  Please refer to the physical examination documented by the anesthesiologist in the anesthesia record on the day of surgery. **        PRIOR LABS/DIAGNOSTIC STUDIES:   All labs and imaging personally reviewed     CT ABDOMEN PELVIS w CONTRAST 5/24/2021  IMPRESSION:   1.  Diverticular disease involving the colon. There is mild edema   along the sigmoid colon which may reflect early or mild changes of   diverticulitis. No cuff locations.   2.  There is a solid enhancing nodule involving the wall of the   distal gastric body anteriorly. This may reflect a GI stromal tumor.   Recommend further assessment with endoscopy and possible biopsy.   3.  Fatty infiltration of liver.   4.  Mild gallbladder wall varices.   5.  Malrotation and distention of the left kidney which lies in the   upper pelvis in the midline. No associated hydronephrosis.        ECHOCARDIOGRAM 12/7/20  Final Conclusion    Visually Estimated EF: 65-70%    Normal LV size and systolic function.    Moderate aortic stenosis, mean aortic transvalvular gradient is 26 mmHg.    Normal estimated PA pressures on this study.    The ascending throacic aorta is midly dilated, measuring 3.9 cm superior to the sinotubular   ridge.      FINDINGS    Left Ventricle Normal left ventricular size. Normal left ventricular systolic function. No   obvious regional wall motion    abnormalities  Mild left ventricular hypertrophy.    Diastolic Function E/e' ratio 8-15 is indeterminate for filling pressure.    Right Ventricle The right ventricle is normal in size and function.    Left Atrium Normal left atrial size.    Right Atrium The right atrium is normal in size.    Aortic Valve Aortic valve not well visualized. Moderate aortic stenosis, mean aortic   transvalvular gradient is 26 mmHg. Trace    aortic regurgitation.    Mitral Valve Normal mitral valve without significant  stenosis or regurgitation.    Tricuspid Valve Structurally normal tricuspid valve without significant stenosis. Trace   tricuspid regurgitation. Estimated    pulmonary artery pressure of 17 mmHg + RA pressure.    Pulmonic Valve Limited view of the pulmonic valve without significant stenosis. No significant   pulmonic regurgitation.    Pericardium Normal pericardium without effusion.    Aorta The ascending throacic aorta is midly dilated, measuring 3.9 cm superior to the   sinotubular ridge.    Inferior Vena Cava Normal inferior vena cava (IVC) size.         BMP:   Lab Results   Component Value Date    POTASSIUM 4.5 11/16/2020    CR 0.96 11/16/2020     (A) 11/16/2020     COAGS:   Lab Results   Component Value Date    INR 1.0 03/03/2020     POC: No results found for: BGM, HCG, HCGS  HEPATIC:   Lab Results   Component Value Date    ALT 23 10/05/2018     OTHER:   Lab Results   Component Value Date    TSH 0.61 12/15/2016       Labs today: None    COVID19 testing scheduled on 7/22/21    Outside records reviewed from: Care Everywhere (Echocardgiogram, CT scan, provider notes @ [facility])    ASSESSMENT and PLAN  Duy Resendiz is a 60 year old male scheduled to undergo  ESOPHAGOGASTRODUODENOSCOPY (EGD); ENDOSCOPIC ULTRASOUND, ESOPHAGOSCOPY / UPPER GASTROINTESTINAL TRACT (GI) with Keyur Scherer MD on 7/26/21 at [facility] for treatment of Subepithelial gastric lesion.      Pt has had prior anesthetic.     No history of anesthetic complications     He has the following specific operative considerations:   # RAYSHAWN 2/8 = low risk  # VTE risk: 0.26%  # Risk of PONV score = 2.  If > 2, anti-emetic intervention recommended.  # Anesthesia considerations:  Refer to PAC assessment in anesthesia records      CARDIAC: METS 4      # RCRI : Cerebrovascular Disease (TIA or CVA).  0.9% risk of major adverse cardiac event.     #  Echo 12/7/20:  EF 65-70%, moderate aortic stenosis 26  mmHg, bicuspid aortic valve    PULMONARY:     # Former smoker, 3 pk yr hx, quit 2010    # Denies asthma or inhaler use    GI:     # GERD, asymptomatic on protonix     # S/P hiatal hernia repair w/ LINX procedure    # Subepithelial gastric lesion.    # Fatty liver     /RENAL:     # Creatinine 1.15, GFR >60 on 5/24/21    # Malrotation and distention of the left kidney on imaging which lies in the upper pelvis in the midline. No associated hydronephrosis    # BPH    ENDO: BMI 27    # No DM    HEME:     # On ASA 81 mg, last dose 7/21/21    ORTHO:     # S/P spinal cord stimulator 12/2020    # Chronic pain    # Cervicogenic headaches      NEURO/PSYCH:     # CVA in 2013, felt to be secondary to Tamoxifen therapy. Denies deficits.    #  Chronic headaches since 1995 due to concussion.    # Hx vertebral artery dissection    MISC:    # Hx breast cancer 2013, s/p resection, no recurrence      Patient is optimized and is acceptable candidate for the proposed procedure. No further diagnostic evaluation is needed.    ** Patient's visit was done virtually today.  A full physical exam was not completed.  Please refer to the physical examination documented by the anesthesiologist in the anesthesia record on the day of surgery. **    Final plan per anesthesiologist on day of surgery.     Arrival time, NPO, shower and medication instructions provided by nursing staff today.  Preparing For Your Surgery handout given.    43 minutes spent on the date of the encounter doing chart review, history and exam, documentation and further activities as noted below:    Prep time: 15 minutes  Visit time: 16 minutes  Documentation time: 12 minutes      Rocio Tidwell PA-C  Preoperative Assessment Center  [facility]  Phone: [phone]  Fax: [phone]

## 2021-07-21 NOTE — PATIENT INSTRUCTIONS
Preparing for Your Surgery      Name:  Duy Resendiz   MRN:  [id number]   :  1960   Today's Date:  2021       Arriving for surgery:  Surgery date:  21  Arrival time:  8:55AM    Restrictions due to COVID 19:       One visitor is allowed in the Pre Op area. When you go into surgery, one visitor is allowed to wait in the Surgery Waiting Room       (provided there is enough space to social distance).   After surgery- Two visitors are allowed at a time if you have a private room and one visitor is allowed for those in a semi-private room.   Every 4 days the visitor(s) can rotate. During the 4 day period, the visitor(s) must be consistent. No visitors under the age of 18 years old.   Visiting Hours: 8 am - 8:30 pm   No ill visitors.   All visitors must wear face mask.    [facility] parking is available for anyone with mobility limitations or disabilities.  ([place]  24 hours/ 7 days a week; [facility]  7 am- 3:30 pm, Mon- Fri)    Please come to:     [facility] [address]  [address] [place] [address]    When entering the hospital you will be asked COVID screening questions, you will then be directed to Registration.  Registration will direct you to the 3rd floor Surgery waiting room. Please ask if you need an escort or a wheelchair to the Surgery Waiting Room.  Preop number- [phone]     What can I eat or drink?  -  You may eat and drink normally for up to 8 hours before your surgery. (Until 21, 2:55AM)  -  You may have clear liquids until 2 hours before surgery. (Until 21, 8:55AM)    Examples of clear liquids:  Water  Clear broth  Juices (apple, white grape, white cranberry  and cider) without pulp  Noncarbonated, powder based beverages  (lemonade and Jamie-Aid)  Sodas (Sprite, 7-Up, ginger ale and seltzer)  Coffee or tea (without milk or cream)  Gatorade    -  No Alcohol for at least 24 hours before surgery      Which medicines can I take?    Hold Aspirin for 7 days before surgery.   Hold Multivitamins for 7 days before surgery.  Hold Supplements for 7 days before surgery.  Hold Ibuprofen (Advil, Motrin) for 1 day before surgery--unless otherwise directed by surgeon.  Hold Naproxen (Aleve) for 4 days before surgery.    -  DO NOT take these medications the day of surgery:    Ferrous Sulfate   Culturell probiotic    Magnesium        -  PLEASE TAKE these medications the day of surgery:    Acetaminophen(Tylenol) as needed   Cyclobenzaprine(Flexeril) as needed    Diazepam(Valium     Hydrocodone Acetaminophen(Norco) as needed    Pantoprazole(Protonix)        How do I prepare myself?  - Please take 2 showers before surgery using Scrubcare or Hibiclens soap.    Use this soap only from the neck to your toes.     Leave the soap on your skin for one minute--then rinse thoroughly.      You may use your own shampoo and conditioner; no other hair products.   - Please remove all jewelry and body piercings.  - No lotions, deodorants or fragrance.   - Bring your ID and insurance card.    - All patients are required to have a Covid-19 test within 4 days of surgery/procedure.      -Patients will be contacted by the [facility] scheduling team within 1 week of surgery to make an appointment.      - Patients may call the Scheduling team at [phone] if they have not been scheduled within 4 days of  surgery.      ALL PATIENTS GOING HOME THE SAME DAY OF SURGERY ARE REQUIRED TO HAVE A RESPONSIBLE ADULT TO DRIVE AND BE IN ATTENDANCE WITH THEM FOR 24 HOURS FOLLOWING SURGERY.      Questions or Concerns:    - For any questions regarding the day of surgery or your hospital stay, please contact the Pre Admission Nursing Office at [phone].       - If you have health changes between today and your surgery please call your surgeon.       For questions after surgery please call your surgeons office.

## 2021-07-21 NOTE — PROGRESS NOTES
Duy is a 60 year old who is being evaluated via a billable video visit.      How would you like to obtain your AVS? MyChart    HPI         Review of Systems         Objective    Vitals - Patient Reported  Pain Score: Extreme Pain (8)        Physical Exam     SUNNY Ochoa LPN

## 2021-07-21 NOTE — ANESTHESIA PREPROCEDURE EVALUATION
Anesthesia Pre-Procedure Evaluation    Patient: Duy Resendiz   MRN: [id number] : 1960        Preoperative Diagnosis: Subepithelial gastric lesion [K31.9]   Procedure : Procedure(s):  ESOPHAGOGASTRODUODENOSCOPY (EGD)  ENDOSCOPIC ULTRASOUND, ESOPHAGOSCOPY / UPPER GASTROINTESTINAL TRACT (GI)     Past Medical History:   Diagnosis Date     Anxiety      Bicuspid aortic valve      BPH (benign prostatic hyperplasia)      Cervical dystonia      Cervicogenic headache      Chronic pain syndrome      Depression      Dysphagia      Gastroesophageal reflux disease without esophagitis      History of CVA (cerebrovascular accident) 2013    Due to Tamoxifen     Nonrheumatic aortic valve stenosis      Post concussion syndrome      Subepithelial gastric lesion       Past Surgical History:   Procedure Laterality Date     HIATAL HERNIA REPAIR      LINX procedure     LYMPH NODE DISSECTION      breast cancer     SPINAL CORD STIMULATOR IMPLANT  2020      No Known Allergies   Social History     Tobacco Use     Smoking status: Former Smoker     Types: Cigarettes     Smokeless tobacco: Never Used   Substance Use Topics     Alcohol use: Not on file      Wt Readings from Last 1 Encounters:   21 74.8 kg (165 lb)        Anesthesia Evaluation   Pt has had prior anesthetic.     No history of anesthetic complications       ROS/MED HX  ENT/Pulmonary:     (+) tobacco use, Past use,  (-) asthma and sleep apnea   Neurologic: Comment: Chronic headaches since  due to concussion.    Hx vertebral artery dissection      (+) CVA, date: , without deficits,  (-) no seizures   Cardiovascular:     (+) -----valvular problems/murmurs type: AS bicuspid aortic valve, gradient 26 mmHg. Previous cardiac testing   Echo: Date: 20 Results:   Visually Estimated EF: 65-70%    Normal LV size and systolic function.    Moderate aortic stenosis, mean aortic transvalvular gradient is 26 mmHg.    Normal estimated PA pressures on this  study.    The ascending throacic aorta is midly dilated, measuring 3.9 cm superior to the sinotubular   ridge.     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 No associated hydronephrosis.      (+) BPH,  (-) renal disease   Endo:  - neg endo ROS  (-) Type II DM   Psychiatric/Substance Use:     (+) psychiatric history anxiety and depression     Infectious Disease:  - neg infectious disease ROS     Malignancy:   (+) Malignancy, History of Breast.Breast CA Remission status post Surgery.        Other:  - neg other ROS          Physical Exam    Airway        Mallampati: II   TM distance: > 3 FB   Neck ROM: full   Mouth opening: > 3 cm    Respiratory Devices and Support         Dental           Cardiovascular          Rhythm and rate: regular and normal   (+) murmur       Pulmonary   pulmonary exam normal        breath sounds clear to auscultation           OUTSIDE LABS:  CBC: No results found for: WBC, HGB, HCT, PLT  BMP:   Lab Results   Component Value Date    POTASSIUM 4.5 11/16/2020    CR 0.96 11/16/2020     (A) 11/16/2020     COAGS:   Lab Results   Component Value Date    INR 1.0 03/03/2020     POC: No results found for: BGM, HCG, HCGS  HEPATIC:   Lab Results   Component Value Date    ALT 23 10/05/2018     OTHER:   Lab Results   Component Value Date    TSH 0.61 12/15/2016       Anesthesia Plan    ASA Status:  3   NPO Status:  NPO Appropriate     Anesthesia Type: MAC.     - Reason for MAC: straight local not clinically adequate, chronic cardiopulmonary disease   Induction: Intravenous.   Maintenance: TIVA.        Consents    Anesthesia Plan(s) and associated risks, benefits, and realistic alternatives discussed. Questions answered and patient/representative(s) expressed understanding.     - Discussed with:  Patient         Postoperative Care    Pain management: IV analgesics, Oral pain medications, Multi-modal analgesia.   PONV prophylaxis: Ondansetron (or other 5HT-3), Background Propofol Infusion     Comments:              PAC Discussion and Assessment    ASA Classification: 3  Case is suitable for: Dat[facility]  Anesthetic techniques and relevant risks discussed: MAC with GA as backup      Possibility and Risk of blood transfusion discussed: No            PAC Resident/NP Anesthesia Assessment: Duy Resendiz is a 60 year old male scheduled to undergo  ESOPHAGOGASTRODUODENOSCOPY (EGD); ENDOSCOPIC ULTRASOUND, ESOPHAGOSCOPY / UPPER GASTROINTESTINAL TRACT (GI) with Keyur Scherer MD on 7/26/21 at [facility] for treatment of Subepithelial gastric lesion.      Pt has had prior anesthetic.     No history of anesthetic complications     He has the following specific operative considerations:   # RAYSHAWN 2/8 = low risk  # VTE risk: 0.26%  # Risk of PONV score = 2.  If > 2, anti-emetic intervention recommended.  # Anesthesia considerations:  Refer to PAC assessment in anesthesia records      CARDIAC: METS 4      # RCRI : Cerebrovascular Disease (TIA or CVA).  0.9% risk of major adverse cardiac event.     #  Echo 12/7/20:  EF 65-70%, moderate aortic stenosis 26 mmHg, bicuspid aortic valve    PULMONARY:     # Former smoker, 3 pk yr hx, quit 2010    # Denies asthma or inhaler use    GI:     # GERD, asymptomatic on protonix     # S/P hiatal hernia repair w/ LINX procedure    # Subepithelial gastric lesion.    # Fatty liver      /RENAL:     # Creatinine 1.15, GFR >60 on 5/24/21    # Malrotation and distention of the left kidney on imaging which lies in the upper pelvis in the midline. No associated hydronephrosis    # BPH    ENDO: BMI 27    # No DM    HEME:     # On ASA 81 mg, last dose 7/21/21    ORTHO:     # S/P spinal cord stimulator 12/2020    # Chronic pain    # Cervicogenic headaches      NEURO/PSYCH:     # CVA in 2013, felt to be secondary to Tamoxifen therapy. Denies deficits.    #  Chronic headaches since 1995 due to concussion.    # Hx vertebral artery dissection    MISC:    # Hx breast cancer 2013, s/p resection, no recurrence      Patient is optimized and is acceptable candidate for the proposed procedure. No further diagnostic evaluation is needed.    ** Patient's visit was done virtually today.  A full physical exam was not completed.  Please refer to the physical examination documented by the anesthesiologist in the anesthesia record on the day of surgery. **    Final plan per anesthesiologist on day of surgery.   Reviewed and Signed by PAC Mid-Level Provider/Resident  Mid-Level Provider/Resident: Rocio Tidwell PA-C  Date: 7/21/21  Time: 1611                               Rocio Tidwell PA-C

## 2021-07-21 NOTE — PROGRESS NOTES
Patient tolerated procedure well. All vital signs stable. Escorted to waiting room after giving discharge instructions    Vandana Lund RN

## 2021-07-22 ENCOUNTER — TELEPHONE (OUTPATIENT)
Dept: ORTHOPEDICS | Facility: CLINIC | Age: 61
End: 2021-07-22

## 2021-07-22 ENCOUNTER — LAB (OUTPATIENT)
Dept: FAMILY MEDICINE | Facility: CLINIC | Age: 61
End: 2021-07-22
Attending: INTERNAL MEDICINE
Payer: COMMERCIAL

## 2021-07-22 DIAGNOSIS — Z11.59 ENCOUNTER FOR SCREENING FOR OTHER VIRAL DISEASES: ICD-10-CM

## 2021-07-22 LAB — SARS-COV-2 RNA RESP QL NAA+PROBE: NEGATIVE

## 2021-07-22 PROCEDURE — U0005 INFEC AGEN DETEC AMPLI PROBE: HCPCS

## 2021-07-22 PROCEDURE — U0003 INFECTIOUS AGENT DETECTION BY NUCLEIC ACID (DNA OR RNA); SEVERE ACUTE RESPIRATORY SYNDROME CORONAVIRUS 2 (SARS-COV-2) (CORONAVIRUS DISEASE [COVID-19]), AMPLIFIED PROBE TECHNIQUE, MAKING USE OF HIGH THROUGHPUT TECHNOLOGIES AS DESCRIBED BY CMS-2020-01-R: HCPCS

## 2021-07-22 NOTE — TELEPHONE ENCOUNTER
[facility] Call Center    Phone Message    May a detailed message be left on voicemail: yes     Reason for Call: Other: Had injection yesterday C5. Believe it was successful. Had a headache after lidocaine wore off. Pain level went down. Would like to investigate getting an injection in lower neck. Quite please. Would like to know where shots/procedures are going in the future?    Action Taken: Message routed to:  [facility] ([facility]    Travel Screening: Not Applicable

## 2021-07-23 NOTE — TELEPHONE ENCOUNTER
See phone message & injection report at [facility] & pt had CT & Xr's done that were ordered.  I called pt & told him per dictation plan,  He needs [facility] Virtual appt. To review all above & he agreed.   appt made.  Call back prn. Pt. Agreed.  ZHANG./Monica Mckeon RN.

## 2021-07-26 ENCOUNTER — HOSPITAL ENCOUNTER (OUTPATIENT)
Facility: CLINIC | Age: 61
Discharge: HOME | End: 2021-07-26
Attending: INTERNAL MEDICINE | Admitting: INTERNAL MEDICINE
Payer: COMMERCIAL

## 2021-07-26 ENCOUNTER — ANESTHESIA (OUTPATIENT)
Dept: SURGERY | Facility: CLINIC | Age: 61
End: 2021-07-26
Payer: COMMERCIAL

## 2021-07-26 VITALS
HEIGHT: 66 IN | DIASTOLIC BLOOD PRESSURE: 93 MMHG | RESPIRATION RATE: 16 BRPM | BODY MASS INDEX: 26.63 KG/M2 | TEMPERATURE: 97.8 F | OXYGEN SATURATION: 98 % | SYSTOLIC BLOOD PRESSURE: 131 MMHG | HEART RATE: 66 BPM

## 2021-07-26 DIAGNOSIS — K31.9 SUBEPITHELIAL GASTRIC LESION: ICD-10-CM

## 2021-07-26 LAB
GLUCOSE BLDC GLUCOMTR-MCNC: 106 MG/DL (ref 70–99)
UPPER EUS: NORMAL

## 2021-07-26 PROCEDURE — 250N000011 HC RX IP 250 OP 636: Performed by: NURSE ANESTHETIST, CERTIFIED REGISTERED

## 2021-07-26 PROCEDURE — 360N000076 HC SURGERY LEVEL 3, PER MIN: Performed by: INTERNAL MEDICINE

## 2021-07-26 PROCEDURE — 999N000141 HC STATISTIC PRE-PROCEDURE NURSING ASSESSMENT: Performed by: INTERNAL MEDICINE

## 2021-07-26 PROCEDURE — 88173 CYTOPATH EVAL FNA REPORT: CPT | Mod: 26 | Performed by: PATHOLOGY

## 2021-07-26 PROCEDURE — 88305 TISSUE EXAM BY PATHOLOGIST: CPT | Mod: 26 | Performed by: PATHOLOGY

## 2021-07-26 PROCEDURE — 88305 TISSUE EXAM BY PATHOLOGIST: CPT | Mod: TC | Performed by: INTERNAL MEDICINE

## 2021-07-26 PROCEDURE — 250N000009 HC RX 250: Performed by: INTERNAL MEDICINE

## 2021-07-26 PROCEDURE — 710N000012 HC RECOVERY PHASE 2, PER MINUTE: Performed by: INTERNAL MEDICINE

## 2021-07-26 PROCEDURE — 370N000017 HC ANESTHESIA TECHNICAL FEE, PER MIN: Performed by: INTERNAL MEDICINE

## 2021-07-26 PROCEDURE — 258N000003 HC RX IP 258 OP 636: Performed by: ANESTHESIOLOGY

## 2021-07-26 PROCEDURE — 250N000009 HC RX 250: Performed by: NURSE ANESTHETIST, CERTIFIED REGISTERED

## 2021-07-26 PROCEDURE — 88172 CYTP DX EVAL FNA 1ST EA SITE: CPT | Mod: 26 | Performed by: PATHOLOGY

## 2021-07-26 PROCEDURE — 272N000001 HC OR GENERAL SUPPLY STERILE: Performed by: INTERNAL MEDICINE

## 2021-07-26 PROCEDURE — 43238 EGD US FINE NEEDLE BX/ASPIR: CPT | Performed by: INTERNAL MEDICINE

## 2021-07-26 RX ORDER — NALOXONE HYDROCHLORIDE 0.4 MG/ML
0.4 INJECTION, SOLUTION INTRAMUSCULAR; INTRAVENOUS; SUBCUTANEOUS
Status: CANCELLED | OUTPATIENT
Start: 2021-07-26

## 2021-07-26 RX ORDER — OXYCODONE HYDROCHLORIDE 5 MG/1
5 TABLET ORAL EVERY 4 HOURS PRN
Status: CANCELLED | OUTPATIENT
Start: 2021-07-26

## 2021-07-26 RX ORDER — NALOXONE HYDROCHLORIDE 0.4 MG/ML
0.2 INJECTION, SOLUTION INTRAMUSCULAR; INTRAVENOUS; SUBCUTANEOUS
Status: CANCELLED | OUTPATIENT
Start: 2021-07-26

## 2021-07-26 RX ORDER — LIDOCAINE HYDROCHLORIDE 20 MG/ML
INJECTION, SOLUTION INFILTRATION; PERINEURAL PRN
Status: DISCONTINUED | OUTPATIENT
Start: 2021-07-26 | End: 2021-07-26

## 2021-07-26 RX ORDER — ONDANSETRON 2 MG/ML
4 INJECTION INTRAMUSCULAR; INTRAVENOUS EVERY 6 HOURS PRN
Status: CANCELLED | OUTPATIENT
Start: 2021-07-26

## 2021-07-26 RX ORDER — FLUMAZENIL 0.1 MG/ML
0.2 INJECTION, SOLUTION INTRAVENOUS
Status: CANCELLED | OUTPATIENT
Start: 2021-07-26 | End: 2021-07-27

## 2021-07-26 RX ORDER — ONDANSETRON 4 MG/1
4 TABLET, ORALLY DISINTEGRATING ORAL EVERY 6 HOURS PRN
Status: CANCELLED | OUTPATIENT
Start: 2021-07-26

## 2021-07-26 RX ORDER — ALBUTEROL SULFATE 0.83 MG/ML
2.5 SOLUTION RESPIRATORY (INHALATION) EVERY 4 HOURS PRN
Status: CANCELLED | OUTPATIENT
Start: 2021-07-26

## 2021-07-26 RX ORDER — LIDOCAINE 40 MG/G
CREAM TOPICAL
Status: DISCONTINUED | OUTPATIENT
Start: 2021-07-26 | End: 2021-07-26 | Stop reason: HOSPADM

## 2021-07-26 RX ORDER — ONDANSETRON 4 MG/1
4 TABLET, ORALLY DISINTEGRATING ORAL EVERY 30 MIN PRN
Status: CANCELLED | OUTPATIENT
Start: 2021-07-26

## 2021-07-26 RX ORDER — PROPOFOL 10 MG/ML
INJECTION, EMULSION INTRAVENOUS PRN
Status: DISCONTINUED | OUTPATIENT
Start: 2021-07-26 | End: 2021-07-26

## 2021-07-26 RX ORDER — PROPOFOL 10 MG/ML
INJECTION, EMULSION INTRAVENOUS CONTINUOUS PRN
Status: DISCONTINUED | OUTPATIENT
Start: 2021-07-26 | End: 2021-07-26

## 2021-07-26 RX ORDER — FENTANYL CITRATE 50 UG/ML
25-50 INJECTION, SOLUTION INTRAMUSCULAR; INTRAVENOUS EVERY 5 MIN PRN
Status: CANCELLED | OUTPATIENT
Start: 2021-07-26 | End: 2021-07-26

## 2021-07-26 RX ORDER — SODIUM CHLORIDE, SODIUM LACTATE, POTASSIUM CHLORIDE, CALCIUM CHLORIDE 600; 310; 30; 20 MG/100ML; MG/100ML; MG/100ML; MG/100ML
INJECTION, SOLUTION INTRAVENOUS CONTINUOUS
Status: DISCONTINUED | OUTPATIENT
Start: 2021-07-26 | End: 2021-07-26 | Stop reason: HOSPADM

## 2021-07-26 RX ORDER — HYDROMORPHONE HCL IN WATER/PF 6 MG/30 ML
.2-.5 PATIENT CONTROLLED ANALGESIA SYRINGE INTRAVENOUS EVERY 10 MIN PRN
Status: CANCELLED | OUTPATIENT
Start: 2021-07-26 | End: 2021-07-26

## 2021-07-26 RX ORDER — MEPERIDINE HYDROCHLORIDE 25 MG/ML
12.5 INJECTION INTRAMUSCULAR; INTRAVENOUS; SUBCUTANEOUS
Status: CANCELLED | OUTPATIENT
Start: 2021-07-26

## 2021-07-26 RX ORDER — ONDANSETRON 2 MG/ML
4 INJECTION INTRAMUSCULAR; INTRAVENOUS EVERY 30 MIN PRN
Status: CANCELLED | OUTPATIENT
Start: 2021-07-26

## 2021-07-26 RX ADMIN — LIDOCAINE HYDROCHLORIDE 100 MG: 20 INJECTION, SOLUTION INFILTRATION; PERINEURAL at 11:09

## 2021-07-26 RX ADMIN — PROPOFOL 50 MG: 10 INJECTION, EMULSION INTRAVENOUS at 11:09

## 2021-07-26 RX ADMIN — SODIUM CHLORIDE, POTASSIUM CHLORIDE, SODIUM LACTATE AND CALCIUM CHLORIDE: 600; 310; 30; 20 INJECTION, SOLUTION INTRAVENOUS at 11:01

## 2021-07-26 RX ADMIN — PROPOFOL 50 MG: 10 INJECTION, EMULSION INTRAVENOUS at 11:47

## 2021-07-26 RX ADMIN — MIDAZOLAM 2 MG: 1 INJECTION INTRAMUSCULAR; INTRAVENOUS at 10:58

## 2021-07-26 RX ADMIN — PROPOFOL 100 MCG/KG/MIN: 10 INJECTION, EMULSION INTRAVENOUS at 11:08

## 2021-07-26 NOTE — ANESTHESIA CARE TRANSFER NOTE
Patient: Duy Resendiz    Procedure(s):  ESOPHAGOGASTRODUODENOSCOPY (EGD)  ENDOSCOPIC ULTRASOUND, ESOPHAGOSCOPY / UPPER GASTROINTESTINAL TRACT (GI)    Diagnosis: Subepithelial gastric lesion [K31.9]  Diagnosis Additional Information: No value filed.    Anesthesia Type:   MAC     Note:    Oropharynx: oropharynx clear of all foreign objects and spontaneously breathing  Level of Consciousness: drowsy  Oxygen Supplementation: room air    Independent Airway: airway patency satisfactory and stable  Dentition: dentition unchanged  Vital Signs Stable: post-procedure vital signs reviewed and stable  Report to RN Given: handoff report given  Patient transferred to: Phase II    Handoff Report: Identifed the Patient, Identified the Reponsible Provider, Reviewed the pertinent medical history, Discussed the surgical course, Reviewed Intra-OP anesthesia mangement and issues during anesthesia, Set expectations for post-procedure period and Allowed opportunity for questions and acknowledgement of understanding      Vitals:  Vitals Value Taken Time   BP     Temp     Pulse     Resp     SpO2         Electronically Signed By: BARRERA Sánchez CRNA  July 26, 2021  12:21 PM

## 2021-07-26 NOTE — ANESTHESIA POSTPROCEDURE EVALUATION
Patient: Duy Resendiz    Procedure(s):  ESOPHAGOGASTRODUODENOSCOPY (EGD) with biopsies  ESOPHAGOGASTRODUODENOSCOPY, WITH FINE NEEDLE ASPIRATION BIOPSY, WITH ENDOSCOPIC ULTRASOUND GUIDANCE    Diagnosis:Subepithelial gastric lesion [K31.9]  Diagnosis Additional Information: No value filed.    Anesthesia Type:  MAC    Note:  Disposition: Outpatient   Postop Pain Control: Uneventful            Sign Out: Well controlled pain   PONV: No   Neuro/Psych: Uneventful            Sign Out: Acceptable/Baseline neuro status   Airway/Respiratory: Uneventful            Sign Out: Acceptable/Baseline resp. status   CV/Hemodynamics: Uneventful            Sign Out: Acceptable CV status; No obvious hypovolemia; No obvious fluid overload   Other NRE: NONE   DID A NON-ROUTINE EVENT OCCUR? No           Last vitals:  Vitals Value Taken Time   /93 07/26/21 1256   Temp 36.6  C (97.8  F) 07/26/21 1256   Pulse 66 07/26/21 1254   Resp 16 07/26/21 1256   SpO2 99 % 07/26/21 1257   Vitals shown include unvalidated device data.    Electronically Signed By: Wu Reid MD  July 26, 2021  1:41 PM

## 2021-07-26 NOTE — OP NOTE
Upper EUS 07/26/2021 10:55 AM [address] [place] [address] [phone]     Endoscopy Department   _______________________________________________________________________________   Patient Name: Duy Resendiz         Procedure Date: 7/26/2021 10:55 AM   MRN: [id number]                       Account Number: [id number]   YOB: 1960             Admit Type: Outpatient   Age: 60                               Room: OR   Gender: Male                          Note Status: Finalized   Attending MD: Keyur Scherer MD       Pause for the Cause: time out performed   Total Sedation Time:                     _______________________________________________________________________________       Procedure:           Upper EUS   Indications:         Suspected mass in stomach on CT scan; CT incidentally                        showed intramural lesion on the anterior wall of the                        distal gastric body measuring 2.1 x 1.6 cm.   Providers:           Keyur Scherer MD   Referring MD:           Requesting Provider: Wu Veliz MD   Medicines:           Monitored Anesthesia Care   Complications:       No immediate complications. Estimated blood loss:                        Minimal.   _______________________________________________________________________________   Procedure:           Pre-Anesthesia Assessment:                        - Prior to the procedure, a History and Physical was                        performed, and patient medications and allergies were                        reviewed. The patient is competent. The risks and                        benefits of the procedure and the sedation options and                        risks were discussed with the patient. All questions                        were answered and informed consent was obtained. Patient                        identification and proposed procedure were verified  by                        the physician, the nurse, the anesthesiologist and the                        anesthetist in the procedure room. Mental Status                        Examination: alert and oriented. Airway Examination:                        normal oropharyngeal airway and neck mobility.                        Respiratory Examination: clear to auscultation. CV                        Examination: normal. Prophylactic Antibiotics: The                        patient does not require prophylactic antibiotics. Prior                        Anticoagulants: The patient has taken no previous                        anticoagulant or antiplatelet agents. ASA Grade                        Assessment: II - A patient with mild systemic disease.                        After reviewing the risks and benefits, the patient was                        deemed in satisfactory condition to undergo the                        procedure. The anesthesia plan was to use monitored                        anesthesia care (MAC). Immediately prior to                        administration of medications, the patient was                        re-assessed for adequacy to receive sedatives. The heart                        rate, respiratory rate, oxygen saturations, blood                        pressure, adequacy of pulmonary ventilation, and                        response to care were monitored throughout the                        procedure. The physical status of the patient was                        re-assessed after the procedure.                        After obtaining informed consent, the endoscope was                        passed under direct vision. Throughout the procedure,                        the patient's blood pressure, pulse, and oxygen                        saturations were monitored continuously. The Endoscope                        was introduced through the mouth, and advanced to the                        second part  of duodenum. The Endoscope was introduced                        through the mouth, and advanced to the second part of                        duodenum. The upper EUS was accomplished without                        difficulty. The patient tolerated the procedure well.                                                                                     Findings:        ENDOSCOPIC FINDING: :        The examined esophagus was endoscopically normal.        A single 10 mm umbilicated submucosal papule (nodule) with no bleeding        and no stigmata of recent bleeding was found on the anterior wall of the        distal gastric body.        The examined duodenum was endoscopically normal.        ENDOSONOGRAPHIC FINDING: :        An oval intramural (subepithelial) lesion was found in the body of the        stomach. The lesion was heterogenous. Sonographically, the lesion        appeared to originate from the submucosa (Layer 3). The lesion measured        12 mm (in maximum thickness). The lesion also measured 18 mm in        diameter. The outer endosonographic borders were poorly defined. Fine        needle biopsy was performed. Color Doppler imaging was utilized prior to        needle puncture to confirm a lack of significant vascular structures        within the needle path. Six passes were made with the 22 gauge        ultrasound Acquire biopsy needle using a transgastric approach. A        visible core of tissue was obtained. Touch preps were performed. The        cellularity of the specimen was adequate. Final cytology results are        pending.        We then proceeded to obtain tunneling biopsies. Biopsies were taken with        a jumbo cold forceps for histology. Verification of patient        identification for the specimen was done by the physician and nurse        using the patient's name, birth date and medical record number.        Estimated blood loss was minimal.        Endoscopic exam with the side viewing  echoendoscope was normal. The        major papilla was normal endoscopically and sonographically.        The bile duct was non-dilated and measured 6 mm in maximal diameter. The        gallbladder was normal. There were no stones or sludge.        The pancreatic parenchyma appeared normal. There were no features of        chronic pancreatitis. No masses, cysts or stones were visualized in the        pancreatic parenchyma. The main pancreatic duct was followed from the        major papilla to the body, excluding pancreas divisum. The pancreatic        duct measured 1.4 mm in the head and 1 mm in the body.        No lymph nodes were seen in the upper abdomen and mediastinum.        Diffuse hyperechoic liver echotexture. No masses were seen in the        visualized portions of the liver.        The left adrenal appeared normal.        Lynx device visualized on EUS at the GE junction.                                                                                     Impression:          - 18 mm x 12 mm umbilicated subepithelial lesion in the                        distal gastric body along the anterior wall. This                        appearance highly suggestive of a pancreatic rest. On                        EUS lesion was hetergeneous possibly due to small ductal                        structures and arising from the submucosal layer. Fine                        needle biopsy performed. Results pending. Also obtained                        tunneling focep biopsies.                        - Hepatic steatosis   Recommendation:      - Discharge patient to home (ambulatory).                        - Await cytology results and await path results.                                                                                       Keyur Scherer MD

## 2021-07-26 NOTE — DISCHARGE INSTRUCTIONS
[facility], [place]  Same-Day EGD Procedure  Adult Discharge Orders & Instructions     You had a procedure known as an Esophagogastroduodenoscopy (EGD) of either the upper gastrointestinal (GI) tract. An UPPER EGD will place a camera into either your mouth or nose to examine your esophagus, stomach, and/or small intestines. Biopsies, small samples of tissue, are often taken to help diagnose and/or classify stages of disease growth. The EGD is also used to help locate areas of the GI tract that may require further treatment (dilation, stenting, clipping, removal, etc.) or medical interventions (medication specific).      After your procedure   1. Make sure to clarify with your healthcare provider any diet restrictions (For example, clear liquid, low fat, no caffeine, etc.)   2. Do NOT take aspirin containing medications or any other blood-thinning medicines (anticoagulants) until your healthcare provider says it's OK.   3. You MAY be prescribed antibiotics, depending on what was done and/or found during your EUS, make sure to take antibiotics as prescribed by your healthcare provider    For 24 hours after surgery  1. Get plenty of rest.  A responsible adult must stay with you for at least 24 hours after you leave the hospital.   2. Do not drive or use heavy equipment.  If you have weakness or tingling, don't drive or use heavy equipment until this feeling goes away.  3. Do not drink alcohol.  4. Avoid strenuous or risky activities (gym, yoga, cycling, etc.).  Ask for help when climbing stairs.   5. You may feel lightheaded.  IF so, sit for a few minutes before standing.  Have someone help you get up.   6. If you have nausea (feel sick to your stomach): Drink only clear liquids such as apple juice, ginger ale, broth or 7-Up.  Rest may also help.  Be sure to drink enough fluids.  Move to a regular diet as you feel able.  7. If you feel bloated or have too much gas, use a heating pad on your  belly to help reduce the discomfort. This should help you feel better.   8. You may have a slight fever. This is normal for the first 24 hours.   9. You may have a dry mouth, a sore throat, muscle aches or trouble sleeping.  These are normal and will go away after 24 hours. A sore throat is most common. Use lozenges or gargle with salt water to ease the discomfort.   10. Do not make important or legal decisions.      Call your doctor for any of the followin. Chest pain, and/or shortness of breath  2. Abdominal  pain, bloating or cramping that has not improved or does not respond to pain reliving medications (Tylenol or narcotics if prescribed)   3. Difficulty swallowing or feeling as though food or liquids are stuck in your throat   4. Sore throat lasting more than 2 days or pain that has worsened over time   5. Black or tarry stools   6. Nausea and/or vomiting that is not resolving or has not responded to anti-nausea medications prescribed to you   7. It has been over 8 to 10 hours since surgery and you are still not able to urinate (pass water)   8. Headache for over 24 hours   9. Fever over 100.5 F (38 C) lasting more than 24 hours after the procedure   10. Signs of jaundice or blockage (fever, chills, abdominal pain, yellowing of the whites of your eyes, yellowing of your skin, and/or passing darker than normal urine)     To contact a doctor, call:   [ ] Dr. Scherer's Office at [phone] (Monday thru Friday 8:00am to 4:30pm)   [ ] [phone] and ask for the GASTROENTEROLOGY resident on call (answered 24 hours a day)   [ ] Emergency Department: [facility]: [phone]   Take it easy when you get home.  Remember, same day surgery DOES NOT MEAN SAME DAY RECOVERY!  Healing is a gradual process.  You will need some time to recover - you may be more tired than you realize at first.  Rest and relax for at least the first 24 hours at home.  You'll feel better and heal faster if you take good care of  yourself.

## 2021-07-27 ENCOUNTER — TELEPHONE (OUTPATIENT)
Dept: ORTHOPEDICS | Facility: CLINIC | Age: 61
End: 2021-07-27

## 2021-07-27 ENCOUNTER — VIRTUAL VISIT (OUTPATIENT)
Dept: ORTHOPEDICS | Facility: CLINIC | Age: 61
End: 2021-07-27
Payer: COMMERCIAL

## 2021-07-27 DIAGNOSIS — M54.12 CERVICAL RADICULOPATHY: Primary | ICD-10-CM

## 2021-07-27 DIAGNOSIS — Z11.59 ENCOUNTER FOR SCREENING FOR OTHER VIRAL DISEASES: ICD-10-CM

## 2021-07-27 LAB
PATH REPORT.COMMENTS IMP SPEC: NORMAL
PATH REPORT.FINAL DX SPEC: NORMAL
PATH REPORT.FINAL DX SPEC: NORMAL
PATH REPORT.GROSS SPEC: NORMAL
PATH REPORT.GROSS SPEC: NORMAL
PATH REPORT.MICROSCOPIC SPEC OTHER STN: NORMAL
PATH REPORT.RELEVANT HX SPEC: NORMAL
PATH REPORT.RELEVANT HX SPEC: NORMAL
PHOTO IMAGE: NORMAL

## 2021-07-27 PROCEDURE — 99214 OFFICE O/P EST MOD 30 MIN: CPT | Mod: 95 | Performed by: ORTHOPAEDIC SURGERY

## 2021-07-27 NOTE — TELEPHONE ENCOUNTER
See phone message & dictation from [facility] appt.  I called pt back & thanked him for update & reassured him that his choice to try PT first before he tried Injection  Made sense.  I transferred him to [facility] scheduling to schedule 2 injections ordered at [facility] appt. But they are booked out until Sept 15th so I also told pt I faxed order to [facility] [employer] & he can call [phone] if he wants sooner.  Pt agreed.    Call back prn.  Pt agreed.  Monica Mckeon RN.

## 2021-07-27 NOTE — LETTER
"    7/27/2021         RE: Duy Resendiz  [address] [address]        Dear Colleague,    Thank you for referring your patient, Duy Resendiz, to the [facility]. Please see a copy of my visit note below.    VIRTUAL VISIT:  Patient is evaluated today via billable virtual visit.    The patient has been notified of following:   \"This virtual visit will be conducted via a call between you and your physician/provider. We have found that certain health care needs can be provided without the need for an in-person physical exam.  This service lets us provide the care you need with a virtual conversation.  If a prescription is necessary we can send it directly to your pharmacy.  If lab work is needed we can place an order for that and you can then stop by our lab to have the test done at a later time.  If during the course of the call the physician/provider feels a virtual visit is not appropriate, you will not be charged for this service.\"     Physician has received verbal consent for a Virtual Visit from the patient.  Platform used:  [place] PUD Video  Time:  1:50pm to 2:04pm  Originating Location (pt. Location): Home  Distant Location (provider location):  [facility]     Chief Complaint   Patient presents with     RECHECK     F/U injection, CT, and Xray        Last Visit Date: 4/1/21  Previous Impression:  60/m, RHD, with:  1.  Multilevel spondylosis C3-C7.  2.  Severe foraminal stenosis bilateral C4-5 and C5-6; milder at bilateral C3-4 and left C6-7.  3.  Chronic axial neck pain and cervicogenic headaches.  4.  Multiple medical co-morbidities including post concussion syndrome, chronic pain, depression, anxiety, CVA and TIA.  Previous Plan:  - Left C5 SNRB with steroid (dxtic and txtic).  Advised to call 1-2 days after to let us know of injection response.  Virtual RTC after to discuss injection response.  If (-), may " consider left C6 SNRB with steoid.  If both injections (-), will consider referral to either [facility] (Dr. Giordano) or [facility] - for further eval and mgmt.      S>  60 year old male, here to review injection response and discuss next steps.    7/21/21 - Left C5 SNRB with steroid (Dr. Rodriguez).  Per report, pain improved: Neck 6/10 to 3; R arm 7/10 to 3; L arm 7/10 to 3.  Per patient, it definitely worked.  The pain level in that spot was better.  ~50% pain relief.  Per patient, he does not remember his pain level being this low since 1995.    He is thinking if he could get a 2nd shot, perhaps lower down in his neck.  Has more pain when turning his head to the R.  Some pain down the left hand.  Tingling more down the R hand.    Has been getting RFA's in cervical spine since 1999.  They generally helped, but not as much as this last injection.      Oswestry (JESSICA) Questionnaire    OSWESTRY DISABILITY INDEX 4/1/2021   Count 9   Sum 27   Oswestry Score (%) 60   Some recent data might be hidden            Neck Disability Index (NDI) Questionnaire    Neck Disability Index (NDI) 7/27/2021   Neck Disability Index: Count 10   NDI: Total Score = SUM (points for all 10 findings) 35   Neck Disability in Percent = (Total Score) / 50 * 100 70 (%)   Some recent data might be hidden      NDI 4/1/21 66%      Visual Analog Pain Scale  Neck Pain Scale 0-10: 7  Right arm pain: 0  Left arm pain: 0    PROMIS-10 Scores  Global Mental Health Score: (P) 9  Global Physical Health Score: (P) 10  PROMIS TOTAL - SUBSCORES: (P) 19    Physical Examination:    This was a virtual visit, so very limited exam could be performed.  Patient seemed alert, oriented x 3, cooperative, with coherent speech, and not in distress.  Able to respond to questions appropriately and follow instructions.    Imaging:    Cervical AP lateral and flexion-extension x-rays from 7/21/2021 reviewed.  Shows multilevel cervical spondylosis.  Ankylosis C2-3.   Retrolisthesis C4-5.  No radiographic evidence of instability.    Cervical CT 7/21/2021 reviewed.  Confirms ankylosis at C2-3.  Multilevel cervical spondylosis, with mild retrolisthesis C4-5.    Assessment:    60/m, RHD, with:  1.  Multilevel spondylosis C3-C7.  2.  Severe foraminal stenosis bilateral C4-5 and C5-6; milder at bilateral C3-4 and left C6-7.  3.  Chronic axial neck pain and cervicogenic headaches.  4.  Multiple medical co-morbidities including post concussion syndrome, chronic pain, depression, anxiety, CVA and TIA.    Plan:    Had good discussion with patient.  I am glad that the injection helped him.  This would suggest that the left C5 nerve root is indeed mediating a significant portion of his pain.  He thinks the remaining pain may be coming from a lower level.  I would tend to agree.  I suspect that the C5-6 level is likely also contributing, and even perhaps C6-7.    Given that his symptoms are bilateral, may be worth considering bilateral C6 nerve root injections.  Discussed this with patient.  He is amenable, and wishes to proceed.    - Bilateral C6 SNRB with steroid.  Per patient, if this helps as much as the first injection did, he would be a very happy camper.    Ultimately, may consider either 2 or 3 level ACDF (C4-C6 or C4-C7).  RTC prn.    TT 15 mins.    Buzz Vega MD    Orthopaedic Spine Surgery  Dept Orthopaedic Surgery, [facility]  [phone] office, [phone] pager  [email or web address]        Again, thank you for allowing me to participate in the care of your patient.        Sincerely,        Buzz Vega MD

## 2021-07-27 NOTE — TELEPHONE ENCOUNTER
[facility] Call Center    Phone Message    May a detailed message be left on voicemail: yes     Reason for Call: Other: Patient was seen my Dr. Vega today and want to clarify an answer that he gave Dr. Vega.      Dr. Vega asked patient why it took him so long to get the injection.  Patient said he wasn't thinking clearly and said it was due to scheduling.    Patient actually wanted to try physical therapy and chiropractic care.  Patient received more mobility, but it did not solve his light headedness and core problem.    Patient want to clarify his answer because he didn't want anyone on Dr. Vega's scheduling team to have negative ramifications.  Patient said he has always had a good experience with the care team.    Patient said he does not need a call back.    Action Taken: Message routed to:  [facility] ortho    Travel Screening: Not Applicable

## 2021-07-27 NOTE — PROGRESS NOTES
"VIRTUAL VISIT:  Patient is evaluated today via billable virtual visit.    The patient has been notified of following:   \"This virtual visit will be conducted via a call between you and your physician/provider. We have found that certain health care needs can be provided without the need for an in-person physical exam.  This service lets us provide the care you need with a virtual conversation.  If a prescription is necessary we can send it directly to your pharmacy.  If lab work is needed we can place an order for that and you can then stop by our lab to have the test done at a later time.  If during the course of the call the physician/provider feels a virtual visit is not appropriate, you will not be charged for this service.\"     Physician has received verbal consent for a Virtual Visit from the patient.  Platform used:  [employer] Video  Time:  1:50pm to 2:04pm  Originating Location (pt. Location): [place]  Distant Location (provider location):  [facility] [place]     Chief Complaint   Patient presents with     RECHECK     F/U injection, CT, and Xray        Last Visit Date: 4/1/21  Previous Impression:  60/m, RHD, with:  1.  Multilevel spondylosis C3-C7.  2.  Severe foraminal stenosis bilateral C4-5 and C5-6; milder at bilateral C3-4 and left C6-7.  3.  Chronic axial neck pain and cervicogenic headaches.  4.  Multiple medical co-morbidities including post concussion syndrome, chronic pain, depression, anxiety, CVA and TIA.  Previous Plan:  - Left C5 SNRB with steroid (dxtic and txtic).  Advised to call 1-2 days after to let us know of injection response.  Virtual RTC after to discuss injection response.  If (-), may consider left C6 SNRB with steoid.  If both injections (-), will consider referral to either [facility] (Dr. Giordano) or [facility] - for further eval and mgmt.      S>  60 year old male, here to review injection response and discuss next steps.    7/21/21 - Left C5 " SNRB with steroid (Dr. Rodriguez).  Per report, pain improved: Neck 6/10 to 3; R arm 7/10 to 3; L arm 7/10 to 3.  Per patient, it definitely worked.  The pain level in that spot was better.  ~50% pain relief.  Per patient, he does not remember his pain level being this low since 1995.    He is thinking if he could get a 2nd shot, perhaps lower down in his neck.  Has more pain when turning his head to the R.  Some pain down the left hand.  Tingling more down the R hand.    Has been getting RFA's in cervical spine since 1999.  They generally helped, but not as much as this last injection.      Oswestry (JESSICA) Questionnaire    OSWESTRY DISABILITY INDEX 4/1/2021   Count 9   Sum 27   Oswestry Score (%) 60   Some recent data might be hidden            Neck Disability Index (NDI) Questionnaire    Neck Disability Index (NDI) 7/27/2021   Neck Disability Index: Count 10   NDI: Total Score = SUM (points for all 10 findings) 35   Neck Disability in Percent = (Total Score) / 50 * 100 70 (%)   Some recent data might be hidden      NDI 4/1/21 66%      Visual Analog Pain Scale  Neck Pain Scale 0-10: 7  Right arm pain: 0  Left arm pain: 0    PROMIS-10 Scores  Global Mental Health Score: (P) 9  Global Physical Health Score: (P) 10  PROMIS TOTAL - SUBSCORES: (P) 19    Physical Examination:    This was a virtual visit, so very limited exam could be performed.  Patient seemed alert, oriented x 3, cooperative, with coherent speech, and not in distress.  Able to respond to questions appropriately and follow instructions.    Imaging:    Cervical AP lateral and flexion-extension x-rays from 7/21/2021 reviewed.  Shows multilevel cervical spondylosis.  Ankylosis C2-3.  Retrolisthesis C4-5.  No radiographic evidence of instability.    Cervical CT 7/21/2021 reviewed.  Confirms ankylosis at C2-3.  Multilevel cervical spondylosis, with mild retrolisthesis C4-5.    Assessment:    60/m, RHD, with:  1.  Multilevel spondylosis C3-C7.  2.  Severe foraminal  stenosis bilateral C4-5 and C5-6; milder at bilateral C3-4 and left C6-7.  3.  Chronic axial neck pain and cervicogenic headaches.  4.  Multiple medical co-morbidities including post concussion syndrome, chronic pain, depression, anxiety, CVA and TIA.    Plan:    Had good discussion with patient.  I am glad that the injection helped him.  This would suggest that the left C5 nerve root is indeed mediating a significant portion of his pain.  He thinks the remaining pain may be coming from a lower level.  I would tend to agree.  I suspect that the C5-6 level is likely also contributing, and even perhaps C6-7.    Given that his symptoms are bilateral, may be worth considering bilateral C6 nerve root injections.  Discussed this with patient.  He is amenable, and wishes to proceed.    - Bilateral C6 SNRB with steroid.  Per patient, if this helps as much as the first injection did, he would be a very happy camper.    Ultimately, may consider either 2 or 3 level ACDF (C4-C6 or C4-C7).  RTC prn.    TT 15 mins.    Buzz Vega MD    Orthopaedic Spine Surgery  Dept Orthopaedic Surgery, [facility]  [phone] office, [phone] pager  [email or web address]

## 2021-07-27 NOTE — LETTER
Date:July 28, 2021      Patient was self referred, no letter generated. Do not send.        [facility] [place] [facility] Information

## 2021-07-30 NOTE — RESULT ENCOUNTER NOTE
Pathology from EGD/EUS reviewed with patient over the phone. See EUS procedure note for details. Sampling was essentially nondiagnostic. 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 Out of an abundance of caution it is recommended we look at it again in 1 year with a repeat EGD/EUS to ensure no changes. Patient asked if it could be related to his symptoms and I explained that would be exceedingly unlikely. The vast majority of pancreatic rests are found incidentally. Only very rare case reports have described acute pancreatitis focally associated with them which does not fit his symptoms for reflux and diarrhea.    Lynn, can you set a reminder for a repeat EGD/EUS in 1 year? Thanks.    Keyur Scherer MD  [facility]  Division of Gastroenterology and Hepatology  [facility] [address] [place], [place] [address]

## 2021-09-04 DIAGNOSIS — Z11.59 ENCOUNTER FOR SCREENING FOR OTHER VIRAL DISEASES: ICD-10-CM

## 2021-09-11 ENCOUNTER — HEALTH MAINTENANCE LETTER (OUTPATIENT)
Age: 61
End: 2021-09-11

## 2022-06-14 ENCOUNTER — PATIENT OUTREACH (OUTPATIENT)
Dept: GASTROENTEROLOGY | Facility: CLINIC | Age: 62
End: 2022-06-14
Payer: COMMERCIAL

## 2022-06-14 NOTE — CONFIDENTIAL NOTE
Called pt to discuss repeat procedure due at end of July with Dr Scherer     repeat EGD/EUS in 1 year    Pt was out of town at the time of my call and is requesting a call back in one week.    Cindy Arriaza, RN, BSN,   [facility]  Care coordinator

## 2022-06-23 NOTE — PROGRESS NOTES
Pt has some concerning findings related to his lungs and has follow up coming for that. Wanted to call me back at a better time to schedule annual surveillance with Dr Scherer

## 2022-08-13 ENCOUNTER — HEALTH MAINTENANCE LETTER (OUTPATIENT)
Age: 62
End: 2022-08-13

## 2022-10-30 ENCOUNTER — HEALTH MAINTENANCE LETTER (OUTPATIENT)
Age: 62
End: 2022-10-30

## 2023-09-03 ENCOUNTER — HEALTH MAINTENANCE LETTER (OUTPATIENT)
Age: 63
End: 2023-09-03

## 2024-12-28 ENCOUNTER — HEALTH MAINTENANCE LETTER (OUTPATIENT)
Age: 64
End: 2024-12-28

## (undated) DEVICE — SUCTION MANIFOLD NEPTUNE 2 SYS 4 PORT 0702-020-000

## (undated) DEVICE — KIT ENDO FIRST STEP DISINFECTANT 200ML W/POUCH EP-4

## (undated) DEVICE — SOL WATER IRRIG 1000ML BOTTLE 2F7114

## (undated) DEVICE — ENDO DEVICE LOCKING AND BIOPSY CAP M00545261

## (undated) DEVICE — ENDO NDL ASPIRATION ULTRASOUND 22GA ACQUIRE M00555540

## (undated) DEVICE — ENDO FORCEP SPIKED SERRATED SHAFT JUMBO 239CM G56998

## (undated) DEVICE — ENDO TUBING CO2 SMARTCAP STERILE DISP 100145CO2EXT

## (undated) DEVICE — PACK ENDOSCOPY GI CUSTOM UMMC

## (undated) DEVICE — KIT CONNECTOR FOR OLYMPUS ENDOSCOPES DEFENDO 100310

## (undated) DEVICE — PAD CHUX UNDERPAD 23X24" 7136

## (undated) DEVICE — ENDO BITE BLOCK ADULT OMNI-BLOC

## (undated) DEVICE — SPECIMEN CONTAINER 3OZ W/FORMALIN 59901

## (undated) RX ORDER — LIDOCAINE HYDROCHLORIDE 10 MG/ML
INJECTION, SOLUTION EPIDURAL; INFILTRATION; INTRACAUDAL; PERINEURAL
Status: DISPENSED
Start: 2021-07-21

## (undated) RX ORDER — ONDANSETRON 2 MG/ML
INJECTION INTRAMUSCULAR; INTRAVENOUS
Status: DISPENSED
Start: 2021-07-26

## (undated) RX ORDER — PROPOFOL 10 MG/ML
INJECTION, EMULSION INTRAVENOUS
Status: DISPENSED
Start: 2021-07-26

## (undated) RX ORDER — DEXAMETHASONE SODIUM PHOSPHATE 10 MG/ML
INJECTION, SOLUTION INTRAMUSCULAR; INTRAVENOUS
Status: DISPENSED
Start: 2021-07-21

## (undated) RX ORDER — LIDOCAINE HYDROCHLORIDE 20 MG/ML
INJECTION, SOLUTION EPIDURAL; INFILTRATION; INTRACAUDAL; PERINEURAL
Status: DISPENSED
Start: 2021-07-26

## (undated) RX ORDER — FENTANYL CITRATE 50 UG/ML
INJECTION, SOLUTION INTRAMUSCULAR; INTRAVENOUS
Status: DISPENSED
Start: 2021-07-26